# Patient Record
Sex: FEMALE | Race: BLACK OR AFRICAN AMERICAN | NOT HISPANIC OR LATINO | Employment: STUDENT | RURAL
[De-identification: names, ages, dates, MRNs, and addresses within clinical notes are randomized per-mention and may not be internally consistent; named-entity substitution may affect disease eponyms.]

---
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
## 2021-03-16 ENCOUNTER — OFFICE VISIT (OUTPATIENT)
Dept: FAMILY MEDICINE | Facility: CLINIC | Age: 21
End: 2021-03-16
Payer: MEDICAID

## 2021-03-16 VITALS
WEIGHT: 289.38 LBS | RESPIRATION RATE: 16 BRPM | DIASTOLIC BLOOD PRESSURE: 70 MMHG | HEIGHT: 68 IN | OXYGEN SATURATION: 98 % | BODY MASS INDEX: 43.86 KG/M2 | TEMPERATURE: 99 F | SYSTOLIC BLOOD PRESSURE: 122 MMHG | HEART RATE: 84 BPM

## 2021-03-16 DIAGNOSIS — N63.13 BREAST LUMP ON RIGHT SIDE AT 7 O'CLOCK POSITION: Primary | ICD-10-CM

## 2021-03-16 PROCEDURE — 99213 PR OFFICE/OUTPT VISIT, EST, LEVL III, 20-29 MIN: ICD-10-PCS | Mod: ,,, | Performed by: NURSE PRACTITIONER

## 2021-03-16 PROCEDURE — 3008F BODY MASS INDEX DOCD: CPT | Mod: ,,, | Performed by: NURSE PRACTITIONER

## 2021-03-16 PROCEDURE — 3008F PR BODY MASS INDEX (BMI) DOCUMENTED: ICD-10-PCS | Mod: ,,, | Performed by: NURSE PRACTITIONER

## 2021-03-16 PROCEDURE — 99213 OFFICE O/P EST LOW 20 MIN: CPT | Mod: ,,, | Performed by: NURSE PRACTITIONER

## 2021-03-26 ENCOUNTER — HOSPITAL ENCOUNTER (OUTPATIENT)
Dept: RADIOLOGY | Facility: HOSPITAL | Age: 21
Discharge: HOME | End: 2021-03-26
Attending: NURSE PRACTITIONER
Payer: MEDICAID

## 2021-03-26 DIAGNOSIS — N63.13 BREAST LUMP ON RIGHT SIDE AT 7 O'CLOCK POSITION: ICD-10-CM

## 2021-03-26 PROCEDURE — 76641 ULTRASOUND BREAST COMPLETE: CPT | Mod: TC,50

## 2021-03-26 PROCEDURE — 76641 ULTRASOUND BREAST COMPLETE: CPT | Mod: 26,RT,, | Performed by: RADIOLOGY

## 2021-03-26 PROCEDURE — 76641 US BREAST BILATERAL COMPLETE: ICD-10-PCS | Mod: 26,RT,, | Performed by: RADIOLOGY

## 2021-04-08 ENCOUNTER — OFFICE VISIT (OUTPATIENT)
Dept: FAMILY MEDICINE | Facility: CLINIC | Age: 21
End: 2021-04-08
Payer: MEDICAID

## 2021-04-08 VITALS
HEART RATE: 118 BPM | SYSTOLIC BLOOD PRESSURE: 101 MMHG | HEIGHT: 66 IN | OXYGEN SATURATION: 98 % | WEIGHT: 293 LBS | TEMPERATURE: 99 F | RESPIRATION RATE: 17 BRPM | BODY MASS INDEX: 47.09 KG/M2 | DIASTOLIC BLOOD PRESSURE: 71 MMHG

## 2021-04-08 DIAGNOSIS — J02.9 ACUTE PHARYNGITIS, UNSPECIFIED ETIOLOGY: ICD-10-CM

## 2021-04-08 DIAGNOSIS — R11.0 NAUSEA: ICD-10-CM

## 2021-04-08 DIAGNOSIS — Z20.828 CONTACT WITH OR EXPOSURE TO VIRAL DISEASE: Primary | ICD-10-CM

## 2021-04-08 LAB
CTP QC/QA: YES
CTP QC/QA: YES
FLUAV AG NPH QL: NEGATIVE
FLUBV AG NPH QL: NEGATIVE
S PYO RRNA THROAT QL PROBE: NEGATIVE
SARS-COV-2 AG RESP QL IA.RAPID: NEGATIVE

## 2021-04-08 PROCEDURE — 99213 PR OFFICE/OUTPT VISIT, EST, LEVL III, 20-29 MIN: ICD-10-PCS | Mod: ,,, | Performed by: NURSE PRACTITIONER

## 2021-04-08 PROCEDURE — 87428 POCT SARS-COV2 (COVID) WITH FLU ANTIGEN: ICD-10-PCS | Mod: QW,,, | Performed by: NURSE PRACTITIONER

## 2021-04-08 PROCEDURE — 99051 PR MEDICAL SERVICES, EVE/WKEND/HOLIDAY: ICD-10-PCS | Mod: ,,, | Performed by: NURSE PRACTITIONER

## 2021-04-08 PROCEDURE — 87428 SARSCOV & INF VIR A&B AG IA: CPT | Mod: QW,,, | Performed by: NURSE PRACTITIONER

## 2021-04-08 PROCEDURE — 87880 STREP A ASSAY W/OPTIC: CPT | Mod: QW,,, | Performed by: NURSE PRACTITIONER

## 2021-04-08 PROCEDURE — 99051 MED SERV EVE/WKEND/HOLIDAY: CPT | Mod: ,,, | Performed by: NURSE PRACTITIONER

## 2021-04-08 PROCEDURE — 87880 POCT RAPID STREP A: ICD-10-PCS | Mod: QW,,, | Performed by: NURSE PRACTITIONER

## 2021-04-08 PROCEDURE — 99213 OFFICE O/P EST LOW 20 MIN: CPT | Mod: ,,, | Performed by: NURSE PRACTITIONER

## 2021-04-08 RX ORDER — PROMETHAZINE HYDROCHLORIDE 25 MG/1
25 TABLET ORAL EVERY 6 HOURS PRN
Qty: 15 TABLET | Refills: 0 | Status: ON HOLD | OUTPATIENT
Start: 2021-04-08 | End: 2021-05-03

## 2021-04-20 ENCOUNTER — OFFICE VISIT (OUTPATIENT)
Dept: SURGERY | Facility: CLINIC | Age: 21
End: 2021-04-20
Payer: MEDICAID

## 2021-04-20 VITALS — WEIGHT: 282 LBS | HEART RATE: 83 BPM | BODY MASS INDEX: 46.98 KG/M2 | HEIGHT: 65 IN | OXYGEN SATURATION: 99 %

## 2021-04-20 DIAGNOSIS — N63.0 BREAST MASS: Primary | ICD-10-CM

## 2021-04-20 DIAGNOSIS — Z20.822 ENCOUNTER FOR LABORATORY TESTING FOR COVID-19 VIRUS: ICD-10-CM

## 2021-04-20 PROCEDURE — 99213 PR OFFICE/OUTPT VISIT, EST, LEVL III, 20-29 MIN: ICD-10-PCS | Mod: S$PBB,,, | Performed by: SURGERY

## 2021-04-20 PROCEDURE — 99214 OFFICE O/P EST MOD 30 MIN: CPT | Mod: PBBFAC | Performed by: SURGERY

## 2021-04-20 PROCEDURE — 99999 PR PBB SHADOW E&M-EST. PATIENT-LVL IV: CPT | Mod: PBBFAC,,, | Performed by: SURGERY

## 2021-04-20 PROCEDURE — 99999 PR PBB SHADOW E&M-EST. PATIENT-LVL IV: ICD-10-PCS | Mod: PBBFAC,,, | Performed by: SURGERY

## 2021-04-20 PROCEDURE — 99213 OFFICE O/P EST LOW 20 MIN: CPT | Mod: S$PBB,,, | Performed by: SURGERY

## 2021-04-20 RX ORDER — SODIUM CHLORIDE 9 MG/ML
INJECTION, SOLUTION INTRAVENOUS CONTINUOUS
Status: CANCELLED | OUTPATIENT
Start: 2021-04-20

## 2021-04-30 DIAGNOSIS — Z01.812 ENCOUNTER FOR PREPROCEDURAL LABORATORY EXAMINATION: Primary | ICD-10-CM

## 2021-05-03 ENCOUNTER — ANESTHESIA (OUTPATIENT)
Dept: SURGERY | Facility: HOSPITAL | Age: 21
End: 2021-05-03
Payer: MEDICAID

## 2021-05-03 ENCOUNTER — HOSPITAL ENCOUNTER (OUTPATIENT)
Facility: HOSPITAL | Age: 21
Discharge: HOME | End: 2021-05-03
Attending: SURGERY | Admitting: SURGERY
Payer: MEDICAID

## 2021-05-03 ENCOUNTER — ANESTHESIA EVENT (OUTPATIENT)
Dept: SURGERY | Facility: HOSPITAL | Age: 21
End: 2021-05-03
Payer: MEDICAID

## 2021-05-03 VITALS
HEIGHT: 66 IN | HEART RATE: 88 BPM | RESPIRATION RATE: 16 BRPM | OXYGEN SATURATION: 96 % | DIASTOLIC BLOOD PRESSURE: 60 MMHG | SYSTOLIC BLOOD PRESSURE: 138 MMHG | BODY MASS INDEX: 46.77 KG/M2 | TEMPERATURE: 98 F | WEIGHT: 291 LBS

## 2021-05-03 DIAGNOSIS — N63.0 BREAST MASS: ICD-10-CM

## 2021-05-03 LAB — HCG UR QL IA.RAPID: NEGATIVE

## 2021-05-03 PROCEDURE — 00400 ANES INTEGUMENTARY SYS NOS: CPT | Performed by: SURGERY

## 2021-05-03 PROCEDURE — 81025 URINE PREGNANCY TEST: CPT | Performed by: ANESTHESIOLOGY

## 2021-05-03 PROCEDURE — D9220A PRA ANESTHESIA: Mod: ANES,,, | Performed by: ANESTHESIOLOGY

## 2021-05-03 PROCEDURE — 36000707: Performed by: SURGERY

## 2021-05-03 PROCEDURE — 71000033 HC RECOVERY, INTIAL HOUR: Performed by: SURGERY

## 2021-05-03 PROCEDURE — 27201423 OPTIME MED/SURG SUP & DEVICES STERILE SUPPLY: Performed by: SURGERY

## 2021-05-03 PROCEDURE — 25000003 PHARM REV CODE 250: Performed by: SURGERY

## 2021-05-03 PROCEDURE — 88305 TISSUE EXAM BY PATHOLOGIST: CPT | Mod: SUR | Performed by: SURGERY

## 2021-05-03 PROCEDURE — 36000706: Performed by: SURGERY

## 2021-05-03 PROCEDURE — 88305 TISSUE EXAM BY PATHOLOGIST: CPT | Mod: 26,,, | Performed by: PATHOLOGY

## 2021-05-03 PROCEDURE — D9220A PRA ANESTHESIA: Mod: CRNA,,, | Performed by: NURSE ANESTHETIST, CERTIFIED REGISTERED

## 2021-05-03 PROCEDURE — 37000009 HC ANESTHESIA EA ADD 15 MINS: Performed by: SURGERY

## 2021-05-03 PROCEDURE — 19301 PR MASTECTOMY, PARTIAL: ICD-10-PCS | Mod: RT,,, | Performed by: SURGERY

## 2021-05-03 PROCEDURE — 37000008 HC ANESTHESIA 1ST 15 MINUTES: Performed by: SURGERY

## 2021-05-03 PROCEDURE — 88305 SURGICAL PATHOLOGY: ICD-10-PCS | Mod: 26,,, | Performed by: PATHOLOGY

## 2021-05-03 PROCEDURE — 63600175 PHARM REV CODE 636 W HCPCS: Performed by: ANESTHESIOLOGY

## 2021-05-03 PROCEDURE — 25000003 PHARM REV CODE 250

## 2021-05-03 PROCEDURE — 19301 PARTIAL MASTECTOMY: CPT | Mod: RT,,, | Performed by: SURGERY

## 2021-05-03 PROCEDURE — 71000015 HC POSTOP RECOV 1ST HR: Performed by: SURGERY

## 2021-05-03 PROCEDURE — 63600175 PHARM REV CODE 636 W HCPCS: Performed by: NURSE ANESTHETIST, CERTIFIED REGISTERED

## 2021-05-03 PROCEDURE — D9220A PRA ANESTHESIA: ICD-10-PCS | Mod: CRNA,,, | Performed by: NURSE ANESTHETIST, CERTIFIED REGISTERED

## 2021-05-03 PROCEDURE — D9220A PRA ANESTHESIA: ICD-10-PCS | Mod: ANES,,, | Performed by: ANESTHESIOLOGY

## 2021-05-03 RX ORDER — SODIUM CHLORIDE 9 MG/ML
INJECTION, SOLUTION INTRAVENOUS CONTINUOUS
Status: DISCONTINUED | OUTPATIENT
Start: 2021-05-03 | End: 2021-05-03 | Stop reason: HOSPADM

## 2021-05-03 RX ORDER — LIDOCAINE HYDROCHLORIDE 10 MG/ML
1 INJECTION, SOLUTION EPIDURAL; INFILTRATION; INTRACAUDAL; PERINEURAL ONCE
Status: DISCONTINUED | OUTPATIENT
Start: 2021-05-03 | End: 2021-05-03 | Stop reason: HOSPADM

## 2021-05-03 RX ORDER — ONDANSETRON 2 MG/ML
INJECTION INTRAMUSCULAR; INTRAVENOUS
Status: DISCONTINUED | OUTPATIENT
Start: 2021-05-03 | End: 2021-05-03

## 2021-05-03 RX ORDER — CEFAZOLIN SODIUM 2 G/50ML
2 SOLUTION INTRAVENOUS
Status: DISCONTINUED | OUTPATIENT
Start: 2021-05-03 | End: 2021-05-03 | Stop reason: HOSPADM

## 2021-05-03 RX ORDER — DIPHENHYDRAMINE HYDROCHLORIDE 50 MG/ML
25 INJECTION INTRAMUSCULAR; INTRAVENOUS EVERY 6 HOURS PRN
Status: DISCONTINUED | OUTPATIENT
Start: 2021-05-03 | End: 2021-05-03 | Stop reason: HOSPADM

## 2021-05-03 RX ORDER — DEXAMETHASONE SODIUM PHOSPHATE 4 MG/ML
INJECTION, SOLUTION INTRA-ARTICULAR; INTRALESIONAL; INTRAMUSCULAR; INTRAVENOUS; SOFT TISSUE
Status: DISCONTINUED | OUTPATIENT
Start: 2021-05-03 | End: 2021-05-03

## 2021-05-03 RX ORDER — HYDROCODONE BITARTRATE AND ACETAMINOPHEN 5; 325 MG/1; MG/1
1 TABLET ORAL EVERY 4 HOURS PRN
Status: DISCONTINUED | OUTPATIENT
Start: 2021-05-03 | End: 2021-05-03 | Stop reason: HOSPADM

## 2021-05-03 RX ORDER — OXYCODONE AND ACETAMINOPHEN 5; 325 MG/1; MG/1
1 TABLET ORAL EVERY 4 HOURS PRN
Qty: 10 TABLET | Refills: 0 | Status: SHIPPED | OUTPATIENT
Start: 2021-05-03 | End: 2021-11-28

## 2021-05-03 RX ORDER — MORPHINE SULFATE 10 MG/ML
4 INJECTION INTRAMUSCULAR; INTRAVENOUS; SUBCUTANEOUS EVERY 5 MIN PRN
Status: DISCONTINUED | OUTPATIENT
Start: 2021-05-03 | End: 2021-05-03 | Stop reason: HOSPADM

## 2021-05-03 RX ORDER — ONDANSETRON 2 MG/ML
4 INJECTION INTRAMUSCULAR; INTRAVENOUS DAILY PRN
Status: DISCONTINUED | OUTPATIENT
Start: 2021-05-03 | End: 2021-05-03 | Stop reason: HOSPADM

## 2021-05-03 RX ORDER — MEPERIDINE HYDROCHLORIDE 25 MG/ML
25 INJECTION INTRAMUSCULAR; INTRAVENOUS; SUBCUTANEOUS EVERY 10 MIN PRN
Status: DISCONTINUED | OUTPATIENT
Start: 2021-05-03 | End: 2021-05-03 | Stop reason: HOSPADM

## 2021-05-03 RX ORDER — BUPIVACAINE HYDROCHLORIDE 2.5 MG/ML
INJECTION, SOLUTION EPIDURAL; INFILTRATION; INTRACAUDAL
Status: DISCONTINUED | OUTPATIENT
Start: 2021-05-03 | End: 2021-05-03 | Stop reason: HOSPADM

## 2021-05-03 RX ORDER — FENTANYL CITRATE 50 UG/ML
INJECTION, SOLUTION INTRAMUSCULAR; INTRAVENOUS
Status: DISCONTINUED | OUTPATIENT
Start: 2021-05-03 | End: 2021-05-03

## 2021-05-03 RX ORDER — PROPOFOL 10 MG/ML
INJECTION, EMULSION INTRAVENOUS
Status: DISCONTINUED | OUTPATIENT
Start: 2021-05-03 | End: 2021-05-03

## 2021-05-03 RX ORDER — SODIUM CHLORIDE, SODIUM LACTATE, POTASSIUM CHLORIDE, CALCIUM CHLORIDE 600; 310; 30; 20 MG/100ML; MG/100ML; MG/100ML; MG/100ML
INJECTION, SOLUTION INTRAVENOUS CONTINUOUS
Status: DISCONTINUED | OUTPATIENT
Start: 2021-05-03 | End: 2021-05-03 | Stop reason: HOSPADM

## 2021-05-03 RX ORDER — HYDROMORPHONE HYDROCHLORIDE 2 MG/ML
0.5 INJECTION, SOLUTION INTRAMUSCULAR; INTRAVENOUS; SUBCUTANEOUS EVERY 5 MIN PRN
Status: DISCONTINUED | OUTPATIENT
Start: 2021-05-03 | End: 2021-05-03 | Stop reason: HOSPADM

## 2021-05-03 RX ORDER — PHENYLEPHRINE HYDROCHLORIDE 10 MG/ML
INJECTION INTRAVENOUS
Status: DISCONTINUED | OUTPATIENT
Start: 2021-05-03 | End: 2021-05-03

## 2021-05-03 RX ORDER — SODIUM CHLORIDE, SODIUM LACTATE, POTASSIUM CHLORIDE, CALCIUM CHLORIDE 600; 310; 30; 20 MG/100ML; MG/100ML; MG/100ML; MG/100ML
INJECTION, SOLUTION INTRAVENOUS CONTINUOUS PRN
Status: DISCONTINUED | OUTPATIENT
Start: 2021-05-03 | End: 2021-05-03

## 2021-05-03 RX ORDER — MIDAZOLAM HYDROCHLORIDE 1 MG/ML
INJECTION INTRAMUSCULAR; INTRAVENOUS
Status: DISCONTINUED | OUTPATIENT
Start: 2021-05-03 | End: 2021-05-03

## 2021-05-03 RX ORDER — LIDOCAINE HCL/PF 100 MG/5ML
SYRINGE (ML) INTRAVENOUS
Status: DISCONTINUED | OUTPATIENT
Start: 2021-05-03 | End: 2021-05-03

## 2021-05-03 RX ADMIN — PHENYLEPHRINE HYDROCHLORIDE 100 MCG: 10 INJECTION INTRAVENOUS at 11:05

## 2021-05-03 RX ADMIN — SODIUM CHLORIDE, POTASSIUM CHLORIDE, SODIUM LACTATE AND CALCIUM CHLORIDE: 600; 310; 30; 20 INJECTION, SOLUTION INTRAVENOUS at 09:05

## 2021-05-03 RX ADMIN — FENTANYL CITRATE 100 MCG: 50 INJECTION INTRAMUSCULAR; INTRAVENOUS at 11:05

## 2021-05-03 RX ADMIN — DEXAMETHASONE SODIUM PHOSPHATE 8 MG: 4 INJECTION, SOLUTION INTRA-ARTICULAR; INTRALESIONAL; INTRAMUSCULAR; INTRAVENOUS; SOFT TISSUE at 11:05

## 2021-05-03 RX ADMIN — MIDAZOLAM HYDROCHLORIDE 2 MG: 1 INJECTION, SOLUTION INTRAMUSCULAR; INTRAVENOUS at 11:05

## 2021-05-03 RX ADMIN — PROPOFOL 200 MG: 10 INJECTION, EMULSION INTRAVENOUS at 11:05

## 2021-05-03 RX ADMIN — ONDANSETRON 8 MG: 2 INJECTION INTRAMUSCULAR; INTRAVENOUS at 11:05

## 2021-05-03 RX ADMIN — Medication 100 MG: at 11:05

## 2021-05-03 RX ADMIN — SODIUM CHLORIDE, POTASSIUM CHLORIDE, SODIUM LACTATE AND CALCIUM CHLORIDE: 600; 310; 30; 20 INJECTION, SOLUTION INTRAVENOUS at 11:05

## 2021-05-04 LAB
ESTROGEN SERPL-MCNC: NORMAL PG/ML
LAB AP GROSS DESCRIPTION: NORMAL
LAB AP LABORATORY NOTES: NORMAL
T3RU NFR SERPL: NORMAL %

## 2021-07-27 ENCOUNTER — HOSPITAL ENCOUNTER (EMERGENCY)
Facility: HOSPITAL | Age: 21
Discharge: HOME | End: 2021-07-27
Payer: MEDICAID

## 2021-07-27 VITALS
TEMPERATURE: 99 F | WEIGHT: 287 LBS | HEIGHT: 66 IN | BODY MASS INDEX: 46.12 KG/M2 | RESPIRATION RATE: 18 BRPM | HEART RATE: 85 BPM | OXYGEN SATURATION: 99 % | DIASTOLIC BLOOD PRESSURE: 98 MMHG | SYSTOLIC BLOOD PRESSURE: 128 MMHG

## 2021-07-27 DIAGNOSIS — Z91.89 AT INCREASED RISK OF EXPOSURE TO COVID-19 VIRUS: ICD-10-CM

## 2021-07-27 DIAGNOSIS — J31.0 RHINITIS, UNSPECIFIED TYPE: Primary | ICD-10-CM

## 2021-07-27 PROCEDURE — 99282 EMERGENCY DEPT VISIT SF MDM: CPT | Mod: ,,, | Performed by: NURSE PRACTITIONER

## 2021-07-27 PROCEDURE — 99281 EMR DPT VST MAYX REQ PHY/QHP: CPT

## 2021-07-27 PROCEDURE — 99282 PR EMERGENCY DEPT VISIT,LEVEL II: ICD-10-PCS | Mod: ,,, | Performed by: NURSE PRACTITIONER

## 2021-07-28 ENCOUNTER — OFFICE VISIT (OUTPATIENT)
Dept: FAMILY MEDICINE | Facility: CLINIC | Age: 21
End: 2021-07-28
Payer: MEDICAID

## 2021-07-28 VITALS
WEIGHT: 270 LBS | RESPIRATION RATE: 18 BRPM | BODY MASS INDEX: 43.39 KG/M2 | HEART RATE: 102 BPM | TEMPERATURE: 99 F | OXYGEN SATURATION: 98 % | SYSTOLIC BLOOD PRESSURE: 114 MMHG | HEIGHT: 66 IN | DIASTOLIC BLOOD PRESSURE: 96 MMHG

## 2021-07-28 DIAGNOSIS — R50.9 FEVER, UNSPECIFIED FEVER CAUSE: ICD-10-CM

## 2021-07-28 DIAGNOSIS — R68.83 CHILLS: ICD-10-CM

## 2021-07-28 DIAGNOSIS — J06.9 ACUTE UPPER RESPIRATORY INFECTION: Primary | ICD-10-CM

## 2021-07-28 PROCEDURE — 99212 OFFICE O/P EST SF 10 MIN: CPT | Mod: ,,, | Performed by: FAMILY MEDICINE

## 2021-07-28 PROCEDURE — 99212 PR OFFICE/OUTPT VISIT, EST, LEVL II, 10-19 MIN: ICD-10-PCS | Mod: ,,, | Performed by: FAMILY MEDICINE

## 2021-07-28 PROCEDURE — 87880 STREP A ASSAY W/OPTIC: CPT | Mod: RHCUB | Performed by: FAMILY MEDICINE

## 2021-07-28 PROCEDURE — 87428 SARSCOV & INF VIR A&B AG IA: CPT | Mod: RHCUB | Performed by: FAMILY MEDICINE

## 2021-07-28 RX ORDER — AMOXICILLIN 500 MG/1
500 TABLET, FILM COATED ORAL EVERY 12 HOURS
Qty: 20 TABLET | Refills: 0 | Status: SHIPPED | OUTPATIENT
Start: 2021-07-28 | End: 2021-08-07

## 2021-07-28 RX ORDER — CHLORPHENIRAMINE MALEATE, DEXTROMETHORPHAN HYDROBROMIDE, AND PHENYLEPHRINE HYDROCHLORIDE 4; 10; 10 MG/1; MG/1; MG/1
1 TABLET, COATED ORAL EVERY 6 HOURS PRN
Qty: 30 TABLET | Refills: 0 | Status: SHIPPED | OUTPATIENT
Start: 2021-07-28 | End: 2022-12-01

## 2021-07-29 ENCOUNTER — OFFICE VISIT (OUTPATIENT)
Dept: OBSTETRICS AND GYNECOLOGY | Facility: CLINIC | Age: 21
End: 2021-07-29
Payer: MEDICAID

## 2021-07-29 ENCOUNTER — TELEPHONE (OUTPATIENT)
Dept: EMERGENCY MEDICINE | Facility: HOSPITAL | Age: 21
End: 2021-07-29

## 2021-07-29 VITALS
HEART RATE: 74 BPM | RESPIRATION RATE: 18 BRPM | DIASTOLIC BLOOD PRESSURE: 72 MMHG | HEIGHT: 66 IN | WEIGHT: 293 LBS | OXYGEN SATURATION: 98 % | SYSTOLIC BLOOD PRESSURE: 122 MMHG | BODY MASS INDEX: 47.09 KG/M2

## 2021-07-29 DIAGNOSIS — Z30.019 ENCOUNTER FOR INITIAL PRESCRIPTION OF CONTRACEPTIVES, UNSPECIFIED CONTRACEPTIVE: Primary | ICD-10-CM

## 2021-07-29 LAB
B-HCG UR QL: NEGATIVE
CTP QC/QA: YES

## 2021-07-29 PROCEDURE — 99214 PR OFFICE/OUTPT VISIT, EST, LEVL IV, 30-39 MIN: ICD-10-PCS | Mod: ,,, | Performed by: OBSTETRICS & GYNECOLOGY

## 2021-07-29 PROCEDURE — 81025 POCT URINE PREGNANCY: ICD-10-PCS | Mod: QW,,, | Performed by: OBSTETRICS & GYNECOLOGY

## 2021-07-29 PROCEDURE — 99214 OFFICE O/P EST MOD 30 MIN: CPT | Mod: ,,, | Performed by: OBSTETRICS & GYNECOLOGY

## 2021-07-29 PROCEDURE — 81025 URINE PREGNANCY TEST: CPT | Mod: QW,,, | Performed by: OBSTETRICS & GYNECOLOGY

## 2021-08-15 ENCOUNTER — TELEPHONE (OUTPATIENT)
Dept: EMERGENCY MEDICINE | Facility: HOSPITAL | Age: 21
End: 2021-08-15

## 2021-08-16 ENCOUNTER — PROCEDURE VISIT (OUTPATIENT)
Dept: OBSTETRICS AND GYNECOLOGY | Facility: CLINIC | Age: 21
End: 2021-08-16
Payer: MEDICAID

## 2021-08-16 VITALS
HEIGHT: 66 IN | BODY MASS INDEX: 44.52 KG/M2 | TEMPERATURE: 97 F | OXYGEN SATURATION: 99 % | RESPIRATION RATE: 18 BRPM | WEIGHT: 277 LBS | DIASTOLIC BLOOD PRESSURE: 81 MMHG | HEART RATE: 75 BPM | SYSTOLIC BLOOD PRESSURE: 126 MMHG

## 2021-08-16 DIAGNOSIS — Z30.019 ENCOUNTER FOR INITIAL PRESCRIPTION OF CONTRACEPTIVES, UNSPECIFIED CONTRACEPTIVE: Primary | ICD-10-CM

## 2021-08-16 LAB
B-HCG UR QL: NEGATIVE
CTP QC/QA: YES

## 2021-08-16 PROCEDURE — 99214 PR OFFICE/OUTPT VISIT, EST, LEVL IV, 30-39 MIN: ICD-10-PCS | Mod: ,,, | Performed by: OBSTETRICS & GYNECOLOGY

## 2021-08-16 PROCEDURE — 81025 POCT URINE PREGNANCY: ICD-10-PCS | Mod: QW,,, | Performed by: OBSTETRICS & GYNECOLOGY

## 2021-08-16 PROCEDURE — 81025 URINE PREGNANCY TEST: CPT | Mod: QW,,, | Performed by: OBSTETRICS & GYNECOLOGY

## 2021-08-16 PROCEDURE — 99214 OFFICE O/P EST MOD 30 MIN: CPT | Mod: ,,, | Performed by: OBSTETRICS & GYNECOLOGY

## 2021-08-16 RX ORDER — NORETHINDRONE ACETATE AND ETHINYL ESTRADIOL .02; 1 MG/1; MG/1
1 TABLET ORAL DAILY
Qty: 30 TABLET | Refills: 11 | Status: SHIPPED | OUTPATIENT
Start: 2021-08-16 | End: 2022-12-01

## 2021-10-01 ENCOUNTER — OFFICE VISIT (OUTPATIENT)
Dept: FAMILY MEDICINE | Facility: CLINIC | Age: 21
End: 2021-10-01
Payer: MEDICAID

## 2021-10-01 VITALS — OXYGEN SATURATION: 100 % | RESPIRATION RATE: 20 BRPM | TEMPERATURE: 98 F | HEART RATE: 97 BPM

## 2021-10-01 DIAGNOSIS — J02.9 ACUTE PHARYNGITIS, UNSPECIFIED ETIOLOGY: ICD-10-CM

## 2021-10-01 DIAGNOSIS — J01.00 ACUTE NON-RECURRENT MAXILLARY SINUSITIS: ICD-10-CM

## 2021-10-01 DIAGNOSIS — J02.9 SORE THROAT: Primary | ICD-10-CM

## 2021-10-01 DIAGNOSIS — Z20.822 EXPOSURE TO COVID-19 VIRUS: ICD-10-CM

## 2021-10-01 PROCEDURE — 87880 STREP A ASSAY W/OPTIC: CPT | Mod: RHCUB | Performed by: NURSE PRACTITIONER

## 2021-10-01 PROCEDURE — 99213 PR OFFICE/OUTPT VISIT, EST, LEVL III, 20-29 MIN: ICD-10-PCS | Mod: ,,, | Performed by: NURSE PRACTITIONER

## 2021-10-01 PROCEDURE — 99213 OFFICE O/P EST LOW 20 MIN: CPT | Mod: ,,, | Performed by: NURSE PRACTITIONER

## 2021-10-01 PROCEDURE — 87428 SARSCOV & INF VIR A&B AG IA: CPT | Mod: RHCUB | Performed by: NURSE PRACTITIONER

## 2021-10-01 RX ORDER — AMOXICILLIN 875 MG/1
875 TABLET, FILM COATED ORAL EVERY 12 HOURS
Qty: 20 TABLET | Refills: 0 | Status: SHIPPED | OUTPATIENT
Start: 2021-10-01 | End: 2021-10-11

## 2021-10-01 RX ORDER — METHYLPREDNISOLONE 4 MG/1
TABLET ORAL
Qty: 1 PACKAGE | Refills: 0 | Status: SHIPPED | OUTPATIENT
Start: 2021-10-01 | End: 2022-03-23

## 2021-10-18 ENCOUNTER — OFFICE VISIT (OUTPATIENT)
Dept: FAMILY MEDICINE | Facility: CLINIC | Age: 21
End: 2021-10-18
Payer: MEDICAID

## 2021-10-18 VITALS
WEIGHT: 293 LBS | DIASTOLIC BLOOD PRESSURE: 88 MMHG | BODY MASS INDEX: 48 KG/M2 | SYSTOLIC BLOOD PRESSURE: 163 MMHG | TEMPERATURE: 98 F | HEART RATE: 99 BPM | OXYGEN SATURATION: 100 %

## 2021-10-18 DIAGNOSIS — B96.89 BV (BACTERIAL VAGINOSIS): Primary | ICD-10-CM

## 2021-10-18 DIAGNOSIS — Z20.2 POSSIBLE EXPOSURE TO STD: ICD-10-CM

## 2021-10-18 DIAGNOSIS — N76.0 BV (BACTERIAL VAGINOSIS): Primary | ICD-10-CM

## 2021-10-18 LAB
CANDIDA SPECIES: NEGATIVE
GARDNERELLA: POSITIVE
HIV 1+O+2 AB SERPL QL: NORMAL
SYPHILIS AB INTERPRETATION: NORMAL
TRICHOMONAS: NEGATIVE

## 2021-10-18 PROCEDURE — 86780 TREPONEMA PALLIDUM (SYPHILIS) ANTIBODY: ICD-10-PCS | Mod: ,,, | Performed by: CLINICAL MEDICAL LABORATORY

## 2021-10-18 PROCEDURE — 87591 N.GONORRHOEAE DNA AMP PROB: CPT | Mod: ,,, | Performed by: CLINICAL MEDICAL LABORATORY

## 2021-10-18 PROCEDURE — 87491 CHLAMYDIA/GONORRHOEAE(GC), PCR: ICD-10-PCS | Mod: ,,, | Performed by: CLINICAL MEDICAL LABORATORY

## 2021-10-18 PROCEDURE — 87591 CHLAMYDIA/GONORRHOEAE(GC), PCR: ICD-10-PCS | Mod: ,,, | Performed by: CLINICAL MEDICAL LABORATORY

## 2021-10-18 PROCEDURE — 87660 TRICHOMONAS VAGIN DIR PROBE: CPT | Mod: ,,, | Performed by: CLINICAL MEDICAL LABORATORY

## 2021-10-18 PROCEDURE — 87480 BACTERIAL VAGINOSIS: ICD-10-PCS | Mod: ,,, | Performed by: CLINICAL MEDICAL LABORATORY

## 2021-10-18 PROCEDURE — 87480 CANDIDA DNA DIR PROBE: CPT | Mod: ,,, | Performed by: CLINICAL MEDICAL LABORATORY

## 2021-10-18 PROCEDURE — 87389 HIV 1 / 2 ANTIBODY: ICD-10-PCS | Mod: ,,, | Performed by: CLINICAL MEDICAL LABORATORY

## 2021-10-18 PROCEDURE — 87660 BACTERIAL VAGINOSIS: ICD-10-PCS | Mod: ,,, | Performed by: CLINICAL MEDICAL LABORATORY

## 2021-10-18 PROCEDURE — 87491 CHLMYD TRACH DNA AMP PROBE: CPT | Mod: ,,, | Performed by: CLINICAL MEDICAL LABORATORY

## 2021-10-18 PROCEDURE — 86780 TREPONEMA PALLIDUM: CPT | Mod: ,,, | Performed by: CLINICAL MEDICAL LABORATORY

## 2021-10-18 PROCEDURE — 87510 GARDNER VAG DNA DIR PROBE: CPT | Mod: ,,, | Performed by: CLINICAL MEDICAL LABORATORY

## 2021-10-18 PROCEDURE — 87510 BACTERIAL VAGINOSIS: ICD-10-PCS | Mod: ,,, | Performed by: CLINICAL MEDICAL LABORATORY

## 2021-10-18 PROCEDURE — 87389 HIV-1 AG W/HIV-1&-2 AB AG IA: CPT | Mod: ,,, | Performed by: CLINICAL MEDICAL LABORATORY

## 2021-10-18 PROCEDURE — 99213 OFFICE O/P EST LOW 20 MIN: CPT | Mod: ,,, | Performed by: NURSE PRACTITIONER

## 2021-10-18 PROCEDURE — 99213 PR OFFICE/OUTPT VISIT, EST, LEVL III, 20-29 MIN: ICD-10-PCS | Mod: ,,, | Performed by: NURSE PRACTITIONER

## 2021-10-19 ENCOUNTER — TELEPHONE (OUTPATIENT)
Dept: FAMILY MEDICINE | Facility: CLINIC | Age: 21
End: 2021-10-19

## 2021-10-19 ENCOUNTER — CLINICAL SUPPORT (OUTPATIENT)
Dept: FAMILY MEDICINE | Facility: CLINIC | Age: 21
End: 2021-10-19
Payer: MEDICAID

## 2021-10-19 LAB
CHLAMYDIA BY PCR: NEGATIVE
N. GONORRHOEAE (GC) BY PCR: NEGATIVE

## 2021-10-19 RX ORDER — METRONIDAZOLE 500 MG/1
500 TABLET ORAL EVERY 12 HOURS
Qty: 14 TABLET | Refills: 0 | Status: SHIPPED | OUTPATIENT
Start: 2021-10-19 | End: 2021-11-28 | Stop reason: ALTCHOICE

## 2021-10-23 ENCOUNTER — HOSPITAL ENCOUNTER (EMERGENCY)
Facility: HOSPITAL | Age: 21
Discharge: HOME | End: 2021-10-23
Payer: MEDICAID

## 2021-10-23 VITALS
SYSTOLIC BLOOD PRESSURE: 134 MMHG | TEMPERATURE: 99 F | OXYGEN SATURATION: 97 % | RESPIRATION RATE: 18 BRPM | HEART RATE: 95 BPM | HEIGHT: 66 IN | BODY MASS INDEX: 47.09 KG/M2 | WEIGHT: 293 LBS | DIASTOLIC BLOOD PRESSURE: 82 MMHG

## 2021-10-23 DIAGNOSIS — J03.90 TONSILLITIS: Primary | ICD-10-CM

## 2021-10-23 PROCEDURE — 63600175 PHARM REV CODE 636 W HCPCS: Performed by: PHYSICIAN ASSISTANT

## 2021-10-23 PROCEDURE — 96372 THER/PROPH/DIAG INJ SC/IM: CPT

## 2021-10-23 PROCEDURE — 99284 EMERGENCY DEPT VISIT MOD MDM: CPT

## 2021-10-23 PROCEDURE — 99283 EMERGENCY DEPT VISIT LOW MDM: CPT | Mod: ,,, | Performed by: PHYSICIAN ASSISTANT

## 2021-10-23 PROCEDURE — 99283 PR EMERGENCY DEPT VISIT,LEVEL III: ICD-10-PCS | Mod: ,,, | Performed by: PHYSICIAN ASSISTANT

## 2021-10-23 PROCEDURE — 25000003 PHARM REV CODE 250: Performed by: PHYSICIAN ASSISTANT

## 2021-10-23 RX ORDER — CLINDAMYCIN PHOSPHATE 150 MG/ML
600 INJECTION, SOLUTION INTRAVENOUS
Status: COMPLETED | OUTPATIENT
Start: 2021-10-23 | End: 2021-10-23

## 2021-10-23 RX ORDER — KETOROLAC TROMETHAMINE 30 MG/ML
30 INJECTION, SOLUTION INTRAMUSCULAR; INTRAVENOUS
Status: COMPLETED | OUTPATIENT
Start: 2021-10-23 | End: 2021-10-23

## 2021-10-23 RX ORDER — AMOXICILLIN AND CLAVULANATE POTASSIUM 875; 125 MG/1; MG/1
1 TABLET, FILM COATED ORAL 2 TIMES DAILY
Qty: 14 TABLET | Refills: 0 | Status: SHIPPED | OUTPATIENT
Start: 2021-10-23 | End: 2022-12-01

## 2021-10-23 RX ORDER — FLUCONAZOLE 150 MG/1
150 TABLET ORAL DAILY
Qty: 1 TABLET | Refills: 0 | Status: SHIPPED | OUTPATIENT
Start: 2021-10-23 | End: 2021-10-24

## 2021-10-23 RX ADMIN — KETOROLAC TROMETHAMINE 30 MG: 30 INJECTION, SOLUTION INTRAMUSCULAR at 09:10

## 2021-10-23 RX ADMIN — CLINDAMYCIN PHOSPHATE 600 MG: 150 INJECTION, SOLUTION INTRAVENOUS at 09:10

## 2021-10-24 ENCOUNTER — TELEPHONE (OUTPATIENT)
Dept: EMERGENCY MEDICINE | Facility: HOSPITAL | Age: 21
End: 2021-10-24
Payer: MEDICAID

## 2021-10-25 ENCOUNTER — OFFICE VISIT (OUTPATIENT)
Dept: FAMILY MEDICINE | Facility: CLINIC | Age: 21
End: 2021-10-25
Payer: MEDICAID

## 2021-10-25 VITALS
BODY MASS INDEX: 41.95 KG/M2 | DIASTOLIC BLOOD PRESSURE: 77 MMHG | TEMPERATURE: 98 F | OXYGEN SATURATION: 99 % | SYSTOLIC BLOOD PRESSURE: 146 MMHG | HEIGHT: 70 IN | HEART RATE: 99 BPM | WEIGHT: 293 LBS | RESPIRATION RATE: 18 BRPM

## 2021-10-25 DIAGNOSIS — J03.01 ACUTE RECURRENT STREPTOCOCCAL TONSILLITIS: Primary | ICD-10-CM

## 2021-10-25 PROCEDURE — 99212 OFFICE O/P EST SF 10 MIN: CPT | Mod: ,,, | Performed by: NURSE PRACTITIONER

## 2021-10-25 PROCEDURE — 99212 PR OFFICE/OUTPT VISIT, EST, LEVL II, 10-19 MIN: ICD-10-PCS | Mod: ,,, | Performed by: NURSE PRACTITIONER

## 2021-11-03 ENCOUNTER — OFFICE VISIT (OUTPATIENT)
Dept: OTOLARYNGOLOGY | Facility: CLINIC | Age: 21
End: 2021-11-03
Payer: MEDICAID

## 2021-11-03 VITALS — HEIGHT: 70 IN | WEIGHT: 293 LBS | BODY MASS INDEX: 41.95 KG/M2

## 2021-11-03 DIAGNOSIS — J03.01 ACUTE RECURRENT STREPTOCOCCAL TONSILLITIS: Primary | ICD-10-CM

## 2021-11-03 DIAGNOSIS — J35.01 CHRONIC TONSILLITIS: ICD-10-CM

## 2021-11-03 PROCEDURE — 99214 OFFICE O/P EST MOD 30 MIN: CPT | Mod: PBBFAC | Performed by: OTOLARYNGOLOGY

## 2021-11-03 PROCEDURE — 99204 PR OFFICE/OUTPT VISIT, NEW, LEVL IV, 45-59 MIN: ICD-10-PCS | Mod: S$PBB,,, | Performed by: OTOLARYNGOLOGY

## 2021-11-03 PROCEDURE — 99204 OFFICE O/P NEW MOD 45 MIN: CPT | Mod: S$PBB,,, | Performed by: OTOLARYNGOLOGY

## 2021-11-15 ENCOUNTER — OFFICE VISIT (OUTPATIENT)
Dept: OBSTETRICS AND GYNECOLOGY | Facility: CLINIC | Age: 21
End: 2021-11-15
Payer: MEDICAID

## 2021-11-15 VITALS
SYSTOLIC BLOOD PRESSURE: 133 MMHG | HEART RATE: 88 BPM | DIASTOLIC BLOOD PRESSURE: 81 MMHG | WEIGHT: 293 LBS | RESPIRATION RATE: 18 BRPM | BODY MASS INDEX: 47.09 KG/M2 | TEMPERATURE: 98 F | HEIGHT: 66 IN

## 2021-11-15 DIAGNOSIS — Z30.41 ENCOUNTER FOR SURVEILLANCE OF CONTRACEPTIVE PILLS: Primary | ICD-10-CM

## 2021-11-15 PROCEDURE — 99213 PR OFFICE/OUTPT VISIT, EST, LEVL III, 20-29 MIN: ICD-10-PCS | Mod: ,,, | Performed by: OBSTETRICS & GYNECOLOGY

## 2021-11-15 PROCEDURE — 99213 OFFICE O/P EST LOW 20 MIN: CPT | Mod: ,,, | Performed by: OBSTETRICS & GYNECOLOGY

## 2021-11-19 ENCOUNTER — ANESTHESIA (OUTPATIENT)
Dept: SURGERY | Facility: HOSPITAL | Age: 21
End: 2021-11-19
Payer: MEDICAID

## 2021-11-19 ENCOUNTER — ANESTHESIA EVENT (OUTPATIENT)
Dept: SURGERY | Facility: HOSPITAL | Age: 21
End: 2021-11-19
Payer: MEDICAID

## 2021-11-19 ENCOUNTER — HOSPITAL ENCOUNTER (OUTPATIENT)
Facility: HOSPITAL | Age: 21
Discharge: HOME | End: 2021-11-19
Attending: OTOLARYNGOLOGY | Admitting: OTOLARYNGOLOGY
Payer: MEDICAID

## 2021-11-19 VITALS
OXYGEN SATURATION: 95 % | WEIGHT: 293 LBS | HEART RATE: 88 BPM | HEIGHT: 66 IN | BODY MASS INDEX: 47.09 KG/M2 | RESPIRATION RATE: 16 BRPM | SYSTOLIC BLOOD PRESSURE: 143 MMHG | TEMPERATURE: 99 F | DIASTOLIC BLOOD PRESSURE: 89 MMHG

## 2021-11-19 DIAGNOSIS — J03.01 ACUTE RECURRENT STREPTOCOCCAL TONSILLITIS: Primary | ICD-10-CM

## 2021-11-19 DIAGNOSIS — J35.01 CHRONIC TONSILLITIS: ICD-10-CM

## 2021-11-19 LAB — HCG UR QL IA.RAPID: NEGATIVE

## 2021-11-19 PROCEDURE — 63600175 PHARM REV CODE 636 W HCPCS: Performed by: ANESTHESIOLOGY

## 2021-11-19 PROCEDURE — 37000009 HC ANESTHESIA EA ADD 15 MINS: Performed by: OTOLARYNGOLOGY

## 2021-11-19 PROCEDURE — 27000165 HC TUBE, ETT CUFFED: Performed by: ANESTHESIOLOGY

## 2021-11-19 PROCEDURE — 27000509 HC TUBE SALEM SUMP ANY SIZE: Performed by: ANESTHESIOLOGY

## 2021-11-19 PROCEDURE — 25000003 PHARM REV CODE 250: Performed by: NURSE ANESTHETIST, CERTIFIED REGISTERED

## 2021-11-19 PROCEDURE — 27000655: Performed by: ANESTHESIOLOGY

## 2021-11-19 PROCEDURE — 25000003 PHARM REV CODE 250: Performed by: OTOLARYNGOLOGY

## 2021-11-19 PROCEDURE — 88304 TISSUE EXAM BY PATHOLOGIST: CPT | Mod: 26,XU,, | Performed by: PATHOLOGY

## 2021-11-19 PROCEDURE — 27000716 HC OXISENSOR PROBE, ANY SIZE: Performed by: ANESTHESIOLOGY

## 2021-11-19 PROCEDURE — 88304 TISSUE EXAM BY PATHOLOGIST: CPT | Mod: SUR | Performed by: OTOLARYNGOLOGY

## 2021-11-19 PROCEDURE — 36000707: Performed by: OTOLARYNGOLOGY

## 2021-11-19 PROCEDURE — 27000689 HC BLADE LARYNGOSCOPE ANY SIZE: Performed by: ANESTHESIOLOGY

## 2021-11-19 PROCEDURE — 81025 URINE PREGNANCY TEST: CPT | Performed by: OTOLARYNGOLOGY

## 2021-11-19 PROCEDURE — D9220A PRA ANESTHESIA: ICD-10-PCS | Mod: CRNA,,, | Performed by: NURSE ANESTHETIST, CERTIFIED REGISTERED

## 2021-11-19 PROCEDURE — 00400 ANES INTEGUMENTARY SYS NOS: CPT | Performed by: OTOLARYNGOLOGY

## 2021-11-19 PROCEDURE — 27000260 *HC AIRWAY ORAL: Performed by: ANESTHESIOLOGY

## 2021-11-19 PROCEDURE — 71000015 HC POSTOP RECOV 1ST HR: Performed by: OTOLARYNGOLOGY

## 2021-11-19 PROCEDURE — 71000016 HC POSTOP RECOV ADDL HR: Performed by: OTOLARYNGOLOGY

## 2021-11-19 PROCEDURE — D9220A PRA ANESTHESIA: Mod: ANES,,, | Performed by: ANESTHESIOLOGY

## 2021-11-19 PROCEDURE — 42826 REMOVAL OF TONSILS: CPT | Mod: ,,, | Performed by: OTOLARYNGOLOGY

## 2021-11-19 PROCEDURE — 42826 PR REMOVAL OF TONSILS,12+ Y/O: ICD-10-PCS | Mod: ,,, | Performed by: OTOLARYNGOLOGY

## 2021-11-19 PROCEDURE — D9220A PRA ANESTHESIA: ICD-10-PCS | Mod: ANES,,, | Performed by: ANESTHESIOLOGY

## 2021-11-19 PROCEDURE — 71000033 HC RECOVERY, INTIAL HOUR: Performed by: OTOLARYNGOLOGY

## 2021-11-19 PROCEDURE — 27000510 HC BLANKET BAIR HUGGER ANY SIZE: Performed by: ANESTHESIOLOGY

## 2021-11-19 PROCEDURE — 88304 SURGICAL PATHOLOGY: ICD-10-PCS | Mod: 26,,, | Performed by: PATHOLOGY

## 2021-11-19 PROCEDURE — 36000706: Performed by: OTOLARYNGOLOGY

## 2021-11-19 PROCEDURE — D9220A PRA ANESTHESIA: Mod: CRNA,,, | Performed by: NURSE ANESTHETIST, CERTIFIED REGISTERED

## 2021-11-19 PROCEDURE — 37000008 HC ANESTHESIA 1ST 15 MINUTES: Performed by: OTOLARYNGOLOGY

## 2021-11-19 PROCEDURE — 27201423 OPTIME MED/SURG SUP & DEVICES STERILE SUPPLY: Performed by: OTOLARYNGOLOGY

## 2021-11-19 PROCEDURE — 63600175 PHARM REV CODE 636 W HCPCS: Performed by: NURSE ANESTHETIST, CERTIFIED REGISTERED

## 2021-11-19 RX ORDER — DEXAMETHASONE SODIUM PHOSPHATE 4 MG/ML
INJECTION, SOLUTION INTRA-ARTICULAR; INTRALESIONAL; INTRAMUSCULAR; INTRAVENOUS; SOFT TISSUE
Status: DISCONTINUED | OUTPATIENT
Start: 2021-11-19 | End: 2021-11-19

## 2021-11-19 RX ORDER — MORPHINE SULFATE 10 MG/ML
4 INJECTION INTRAMUSCULAR; INTRAVENOUS; SUBCUTANEOUS EVERY 5 MIN PRN
Status: DISCONTINUED | OUTPATIENT
Start: 2021-11-19 | End: 2021-11-19 | Stop reason: HOSPADM

## 2021-11-19 RX ORDER — SODIUM CHLORIDE, SODIUM LACTATE, POTASSIUM CHLORIDE, CALCIUM CHLORIDE 600; 310; 30; 20 MG/100ML; MG/100ML; MG/100ML; MG/100ML
INJECTION, SOLUTION INTRAVENOUS CONTINUOUS
Status: DISCONTINUED | OUTPATIENT
Start: 2021-11-19 | End: 2021-11-19 | Stop reason: HOSPADM

## 2021-11-19 RX ORDER — SODIUM CHLORIDE, SODIUM LACTATE, POTASSIUM CHLORIDE, CALCIUM CHLORIDE 600; 310; 30; 20 MG/100ML; MG/100ML; MG/100ML; MG/100ML
125 INJECTION, SOLUTION INTRAVENOUS CONTINUOUS
Status: DISCONTINUED | OUTPATIENT
Start: 2021-11-19 | End: 2021-11-19 | Stop reason: HOSPADM

## 2021-11-19 RX ORDER — LIDOCAINE HYDROCHLORIDE 10 MG/ML
1 INJECTION, SOLUTION EPIDURAL; INFILTRATION; INTRACAUDAL; PERINEURAL ONCE
Status: DISCONTINUED | OUTPATIENT
Start: 2021-11-19 | End: 2021-11-19 | Stop reason: HOSPADM

## 2021-11-19 RX ORDER — ONDANSETRON 2 MG/ML
4 INJECTION INTRAMUSCULAR; INTRAVENOUS DAILY PRN
Status: DISCONTINUED | OUTPATIENT
Start: 2021-11-19 | End: 2021-11-19 | Stop reason: HOSPADM

## 2021-11-19 RX ORDER — LIDOCAINE HYDROCHLORIDE 20 MG/ML
INJECTION, SOLUTION EPIDURAL; INFILTRATION; INTRACAUDAL; PERINEURAL
Status: DISCONTINUED | OUTPATIENT
Start: 2021-11-19 | End: 2021-11-19

## 2021-11-19 RX ORDER — FENTANYL CITRATE 50 UG/ML
INJECTION, SOLUTION INTRAMUSCULAR; INTRAVENOUS
Status: DISCONTINUED | OUTPATIENT
Start: 2021-11-19 | End: 2021-11-19

## 2021-11-19 RX ORDER — PROMETHAZINE HYDROCHLORIDE 25 MG/ML
INJECTION, SOLUTION INTRAMUSCULAR; INTRAVENOUS
Status: DISCONTINUED | OUTPATIENT
Start: 2021-11-19 | End: 2021-11-19

## 2021-11-19 RX ORDER — HYDROCODONE BITARTRATE AND ACETAMINOPHEN 7.5; 325 MG/15ML; MG/15ML
15 SOLUTION ORAL EVERY 4 HOURS PRN
Status: DISCONTINUED | OUTPATIENT
Start: 2021-11-19 | End: 2021-11-19 | Stop reason: HOSPADM

## 2021-11-19 RX ORDER — MIDAZOLAM HYDROCHLORIDE 1 MG/ML
INJECTION INTRAMUSCULAR; INTRAVENOUS
Status: DISCONTINUED | OUTPATIENT
Start: 2021-11-19 | End: 2021-11-19

## 2021-11-19 RX ORDER — ROCURONIUM BROMIDE 10 MG/ML
INJECTION, SOLUTION INTRAVENOUS
Status: DISCONTINUED | OUTPATIENT
Start: 2021-11-19 | End: 2021-11-19

## 2021-11-19 RX ORDER — ONDANSETRON 2 MG/ML
INJECTION INTRAMUSCULAR; INTRAVENOUS
Status: DISCONTINUED | OUTPATIENT
Start: 2021-11-19 | End: 2021-11-19

## 2021-11-19 RX ORDER — MEPERIDINE HYDROCHLORIDE 25 MG/ML
25 INJECTION INTRAMUSCULAR; INTRAVENOUS; SUBCUTANEOUS EVERY 10 MIN PRN
Status: DISCONTINUED | OUTPATIENT
Start: 2021-11-19 | End: 2021-11-19 | Stop reason: HOSPADM

## 2021-11-19 RX ORDER — PROPOFOL 10 MG/ML
VIAL (ML) INTRAVENOUS
Status: DISCONTINUED | OUTPATIENT
Start: 2021-11-19 | End: 2021-11-19

## 2021-11-19 RX ORDER — DIPHENHYDRAMINE HYDROCHLORIDE 50 MG/ML
25 INJECTION INTRAMUSCULAR; INTRAVENOUS EVERY 6 HOURS PRN
Status: DISCONTINUED | OUTPATIENT
Start: 2021-11-19 | End: 2021-11-19 | Stop reason: HOSPADM

## 2021-11-19 RX ORDER — HYDROMORPHONE HYDROCHLORIDE 2 MG/ML
0.5 INJECTION, SOLUTION INTRAMUSCULAR; INTRAVENOUS; SUBCUTANEOUS EVERY 5 MIN PRN
Status: COMPLETED | OUTPATIENT
Start: 2021-11-19 | End: 2021-11-19

## 2021-11-19 RX ORDER — SUCCINYLCHOLINE CHLORIDE 20 MG/ML
INJECTION INTRAMUSCULAR; INTRAVENOUS
Status: DISCONTINUED | OUTPATIENT
Start: 2021-11-19 | End: 2021-11-19

## 2021-11-19 RX ADMIN — LIDOCAINE HYDROCHLORIDE 100 MG: 20 INJECTION, SOLUTION INTRAVENOUS at 09:11

## 2021-11-19 RX ADMIN — FENTANYL CITRATE 100 MCG: 50 INJECTION INTRAMUSCULAR; INTRAVENOUS at 09:11

## 2021-11-19 RX ADMIN — PROPOFOL 200 MG: 10 INJECTION, EMULSION INTRAVENOUS at 09:11

## 2021-11-19 RX ADMIN — HYDROMORPHONE HYDROCHLORIDE 0.5 MG: 2 INJECTION INTRAMUSCULAR; INTRAVENOUS; SUBCUTANEOUS at 10:11

## 2021-11-19 RX ADMIN — SUCCINYLCHOLINE CHLORIDE 100 MG: 20 INJECTION, SOLUTION INTRAMUSCULAR; INTRAVENOUS at 09:11

## 2021-11-19 RX ADMIN — ONDANSETRON 8 MG: 2 INJECTION INTRAMUSCULAR; INTRAVENOUS at 09:11

## 2021-11-19 RX ADMIN — SODIUM CHLORIDE, POTASSIUM CHLORIDE, SODIUM LACTATE AND CALCIUM CHLORIDE: 600; 310; 30; 20 INJECTION, SOLUTION INTRAVENOUS at 09:11

## 2021-11-19 RX ADMIN — DEXAMETHASONE SODIUM PHOSPHATE 10 MG: 4 INJECTION, SOLUTION INTRA-ARTICULAR; INTRALESIONAL; INTRAMUSCULAR; INTRAVENOUS; SOFT TISSUE at 09:11

## 2021-11-19 RX ADMIN — SUGAMMADEX 200 MG: 100 INJECTION, SOLUTION INTRAVENOUS at 09:11

## 2021-11-19 RX ADMIN — ROCURONIUM BROMIDE 5 MG: 10 INJECTION, SOLUTION INTRAVENOUS at 09:11

## 2021-11-19 RX ADMIN — MIDAZOLAM 2 MG: 1 INJECTION INTRAMUSCULAR; INTRAVENOUS at 09:11

## 2021-11-19 RX ADMIN — PROMETHAZINE HYDROCHLORIDE 5 MG: 25 INJECTION INTRAMUSCULAR; INTRAVENOUS at 09:11

## 2021-11-22 ENCOUNTER — OFFICE VISIT (OUTPATIENT)
Dept: FAMILY MEDICINE | Facility: CLINIC | Age: 21
End: 2021-11-22
Payer: MEDICAID

## 2021-11-22 VITALS
TEMPERATURE: 97 F | RESPIRATION RATE: 18 BRPM | SYSTOLIC BLOOD PRESSURE: 128 MMHG | HEIGHT: 66 IN | DIASTOLIC BLOOD PRESSURE: 89 MMHG | OXYGEN SATURATION: 99 % | HEART RATE: 78 BPM | WEIGHT: 293 LBS | BODY MASS INDEX: 47.09 KG/M2

## 2021-11-22 DIAGNOSIS — H66.91 RIGHT OTITIS MEDIA, UNSPECIFIED OTITIS MEDIA TYPE: ICD-10-CM

## 2021-11-22 DIAGNOSIS — Z90.89 S/P TONSILLECTOMY: Primary | ICD-10-CM

## 2021-11-22 PROCEDURE — 99214 OFFICE O/P EST MOD 30 MIN: CPT | Mod: ,,, | Performed by: FAMILY MEDICINE

## 2021-11-22 PROCEDURE — 99214 PR OFFICE/OUTPT VISIT, EST, LEVL IV, 30-39 MIN: ICD-10-PCS | Mod: ,,, | Performed by: FAMILY MEDICINE

## 2021-11-22 RX ORDER — CLINDAMYCIN PALMITATE HYDROCHLORIDE (PEDIATRIC) 75 MG/5ML
150 SOLUTION ORAL EVERY 8 HOURS
Qty: 300 ML | Refills: 0 | Status: SHIPPED | OUTPATIENT
Start: 2021-11-22 | End: 2021-11-29

## 2021-11-22 RX ORDER — HYDROCODONE BITARTRATE AND ACETAMINOPHEN 10; 325 MG/1; MG/1
1 TABLET ORAL EVERY 6 HOURS PRN
Qty: 8 TABLET | Refills: 0 | Status: SHIPPED | OUTPATIENT
Start: 2021-11-22 | End: 2022-12-01

## 2021-11-30 ENCOUNTER — OFFICE VISIT (OUTPATIENT)
Dept: OTOLARYNGOLOGY | Facility: CLINIC | Age: 21
End: 2021-11-30
Payer: MEDICAID

## 2021-11-30 VITALS — WEIGHT: 293 LBS | HEIGHT: 66 IN | BODY MASS INDEX: 47.09 KG/M2

## 2021-11-30 DIAGNOSIS — J35.01 CHRONIC TONSILLITIS: Primary | ICD-10-CM

## 2021-11-30 PROCEDURE — 99213 OFFICE O/P EST LOW 20 MIN: CPT | Mod: PBBFAC | Performed by: OTOLARYNGOLOGY

## 2021-11-30 PROCEDURE — 99024 PR POST-OP FOLLOW-UP VISIT: ICD-10-PCS | Mod: ,,, | Performed by: OTOLARYNGOLOGY

## 2021-11-30 PROCEDURE — 99024 POSTOP FOLLOW-UP VISIT: CPT | Mod: ,,, | Performed by: OTOLARYNGOLOGY

## 2022-01-03 PROBLEM — J01.00 ACUTE NON-RECURRENT MAXILLARY SINUSITIS: Status: RESOLVED | Noted: 2021-10-01 | Resolved: 2022-01-03

## 2022-01-12 ENCOUNTER — OFFICE VISIT (OUTPATIENT)
Dept: FAMILY MEDICINE | Facility: CLINIC | Age: 22
End: 2022-01-12
Payer: MEDICAID

## 2022-01-12 VITALS
HEART RATE: 96 BPM | TEMPERATURE: 97 F | WEIGHT: 293 LBS | SYSTOLIC BLOOD PRESSURE: 126 MMHG | RESPIRATION RATE: 16 BRPM | DIASTOLIC BLOOD PRESSURE: 82 MMHG | BODY MASS INDEX: 47.09 KG/M2 | HEIGHT: 66 IN

## 2022-01-12 DIAGNOSIS — Z00.00 ROUTINE GENERAL MEDICAL EXAMINATION AT A HEALTH CARE FACILITY: Primary | ICD-10-CM

## 2022-01-12 PROCEDURE — 3008F BODY MASS INDEX DOCD: CPT | Mod: CPTII,,, | Performed by: FAMILY MEDICINE

## 2022-01-12 PROCEDURE — 99499 PR PHYSICAL - BASIC NON DOT/CDL: ICD-10-PCS | Mod: ,,, | Performed by: FAMILY MEDICINE

## 2022-01-12 PROCEDURE — 1160F RVW MEDS BY RX/DR IN RCRD: CPT | Mod: CPTII,,, | Performed by: FAMILY MEDICINE

## 2022-01-12 PROCEDURE — 99499 UNLISTED E&M SERVICE: CPT | Mod: ,,, | Performed by: FAMILY MEDICINE

## 2022-01-12 PROCEDURE — 3079F PR MOST RECENT DIASTOLIC BLOOD PRESSURE 80-89 MM HG: ICD-10-PCS | Mod: CPTII,,, | Performed by: FAMILY MEDICINE

## 2022-01-12 PROCEDURE — 3079F DIAST BP 80-89 MM HG: CPT | Mod: CPTII,,, | Performed by: FAMILY MEDICINE

## 2022-01-12 PROCEDURE — 1159F PR MEDICATION LIST DOCUMENTED IN MEDICAL RECORD: ICD-10-PCS | Mod: CPTII,,, | Performed by: FAMILY MEDICINE

## 2022-01-12 PROCEDURE — 3074F SYST BP LT 130 MM HG: CPT | Mod: CPTII,,, | Performed by: FAMILY MEDICINE

## 2022-01-12 PROCEDURE — 3008F PR BODY MASS INDEX (BMI) DOCUMENTED: ICD-10-PCS | Mod: CPTII,,, | Performed by: FAMILY MEDICINE

## 2022-01-12 PROCEDURE — 1159F MED LIST DOCD IN RCRD: CPT | Mod: CPTII,,, | Performed by: FAMILY MEDICINE

## 2022-01-12 PROCEDURE — 1160F PR REVIEW ALL MEDS BY PRESCRIBER/CLIN PHARMACIST DOCUMENTED: ICD-10-PCS | Mod: CPTII,,, | Performed by: FAMILY MEDICINE

## 2022-01-12 PROCEDURE — 3074F PR MOST RECENT SYSTOLIC BLOOD PRESSURE < 130 MM HG: ICD-10-PCS | Mod: CPTII,,, | Performed by: FAMILY MEDICINE

## 2022-01-12 NOTE — PROGRESS NOTES
Eric Lee MD        PATIENT NAME: Tulio Chance  : 2000  DATE: 22  MRN: [id number]      Billing Provider: Eric Lee MD  Level of Service: [place] OFFICE/OUTPT VISIT, EST, LEVL II, 10-19 MIN  Patient PCP Information     Provider PCP Type    YANN Milian General          Reason for Visit / Chief Complaint: Annual Exam (School physical)       Update PCP  Update Chief Complaint         History of Present Illness / Problem Focused Workflow     Tulio Chance presents to the clinic with Annual Exam (School physical)     Physical.  In nursing school.        Review of Systems     Review of Systems   Constitutional: Negative for activity change, appetite change, fever and unexpected weight change.   HENT: Negative for congestion, rhinorrhea, sinus pressure, sinus pain, sore throat and trouble swallowing.    Eyes: Negative for photophobia, pain, discharge and visual disturbance.   Respiratory: Negative for cough, chest tightness, wheezing and stridor.    Cardiovascular: Negative for chest pain, palpitations and leg swelling.   Gastrointestinal: Negative for abdominal pain, blood in stool, constipation, diarrhea and nausea.   Endocrine: Negative for polydipsia, polyphagia and polyuria.   Genitourinary: Negative for difficulty urinating, flank pain and hematuria.   Musculoskeletal: Negative for arthralgias and neck pain.   Skin: Negative for rash.   Allergic/Immunologic: Negative for food allergies.   Neurological: Negative for dizziness, tremors, seizures, syncope, weakness (global weakness) and headaches.   Psychiatric/Behavioral: Negative for behavioral problems, confusion, decreased concentration, dysphoric mood and hallucinations. The patient is not nervous/anxious.         Medical / Social / Family History     Past Medical History:   Diagnosis Date    Breast lump     Breast mass        Past Surgical History:   Procedure Laterality Date    APPENDECTOMY      BREAST BIOPSY Right  5/3/2021    Procedure: BIOPSY, BREAST, WITH LUMPECTOMY;  Surgeon: Eva Farmer MD;  Location: [facility] OR;  Service: General;  Laterality: Right;    TONSILLECTOMY Bilateral 11/19/2021    Procedure: TONSILLECTOMY;  Surgeon: Heber Tatum MD;  Location: [facility] OR;  Service: ENT;  Laterality: Bilateral;       Social History  Ms.  reports that she has never smoked. She has never used smokeless tobacco. She reports previous alcohol use. She reports that she does not use drugs.    Family History  Ms.'s family history includes Asthma in her brother; Diabetes in her maternal grandmother; Heart disease in her father and maternal grandfather; Hypertension in her father, maternal grandfather, maternal grandmother, and mother.    Medications and Allergies     Medications  No outpatient medications have been marked as taking for the 1/12/22 encounter (Office Visit) with Eric Lee MD.       Allergies  Review of patient's allergies indicates:  No Known Allergies    Physical Examination     Vitals:    01/12/22 1529   BP: 126/82   Pulse: 96   Resp: 16   Temp: 97.1 °F (36.2 °C)     Physical Exam  Constitutional:       General: She is not in acute distress.     Appearance: Normal appearance.   HENT:      Head: Normocephalic.      Right Ear: Tympanic membrane and ear canal normal.      Left Ear: Tympanic membrane and ear canal normal.      Nose: Nose normal.      Mouth/Throat:      Mouth: Mucous membranes are moist.      Pharynx: No oropharyngeal exudate.   Eyes:      Extraocular Movements: Extraocular movements intact.      Pupils: Pupils are equal, round, and reactive to light.   Cardiovascular:      Rate and Rhythm: Normal rate and regular rhythm.      Heart sounds: No murmur heard.      Pulmonary:      Effort: Pulmonary effort is normal.      Breath sounds: Normal breath sounds. No wheezing.   Abdominal:      General: Abdomen is flat. Bowel sounds are normal.      Palpations: Abdomen is soft.      Hernia: No  hernia is present.   Musculoskeletal:         General: Normal range of motion.      Cervical back: Normal range of motion and neck supple.      Right lower leg: No edema.      Left lower leg: No edema.   Lymphadenopathy:      Cervical: No cervical adenopathy.   Skin:     General: Skin is warm and dry.      Coloration: Skin is not jaundiced.      Findings: No lesion.   Neurological:      General: No focal deficit present.      Mental Status: She is alert and oriented to person, place, and time.      Cranial Nerves: No cranial nerve deficit.      Gait: Gait normal.   Psychiatric:         Mood and Affect: Mood normal.         Behavior: Behavior normal.         Judgment: Judgment normal.          Assessment and Plan (including Health Maintenance)      Problem List  Smart Sets  Document Outside HM   :    Plan:   Routine general medical examination at a health care facility           Health Maintenance Due   Topic Date Due    Hepatitis C Screening  Never done    Lipid Panel  Never done    COVID-19 Vaccine (1) Never done    HPV Vaccines (1 - 2-dose series) Never done    Chlamydia Screening  Never done    TETANUS VACCINE  Never done    Influenza Vaccine (1) Never done       Problem List Items Addressed This Visit    None     Visit Diagnoses     Routine general medical examination at a health care facility    -  Primary          The patient has no Health Maintenance topics of status Not Due    Future Appointments   Date Time Provider Department Center   1/19/2022  4:00 PM NURSE, [facility] FAMILY [facility]IC[facility] Primary   8/25/2022  2:00 PM Jenelle Morales MD [facility]        There are no Patient Instructions on file for this visit.  Follow up if symptoms worsen or fail to improve.     Signature:  Eric Lee MD      Date of encounter: 1/12/22

## 2022-01-28 ENCOUNTER — OFFICE VISIT (OUTPATIENT)
Dept: FAMILY MEDICINE | Facility: CLINIC | Age: 22
End: 2022-01-28
Payer: MEDICAID

## 2022-01-28 VITALS
HEIGHT: 66 IN | TEMPERATURE: 99 F | RESPIRATION RATE: 20 BRPM | SYSTOLIC BLOOD PRESSURE: 124 MMHG | OXYGEN SATURATION: 99 % | BODY MASS INDEX: 45.64 KG/M2 | DIASTOLIC BLOOD PRESSURE: 80 MMHG | HEART RATE: 105 BPM | WEIGHT: 284 LBS

## 2022-01-28 DIAGNOSIS — J02.9 ACUTE PHARYNGITIS, UNSPECIFIED ETIOLOGY: ICD-10-CM

## 2022-01-28 DIAGNOSIS — R50.9 FEVER, UNSPECIFIED FEVER CAUSE: Primary | ICD-10-CM

## 2022-01-28 DIAGNOSIS — Z20.822 EXPOSURE TO COVID-19 VIRUS: ICD-10-CM

## 2022-01-28 LAB
CTP QC/QA: YES
CTP QC/QA: YES
S PYO RRNA THROAT QL PROBE: NEGATIVE
SARS-COV-2 AG RESP QL IA.RAPID: NEGATIVE

## 2022-01-28 PROCEDURE — 87426 SARSCOV CORONAVIRUS AG IA: CPT | Mod: RHCUB | Performed by: FAMILY MEDICINE

## 2022-01-28 PROCEDURE — 3079F PR MOST RECENT DIASTOLIC BLOOD PRESSURE 80-89 MM HG: ICD-10-PCS | Mod: CPTII,,, | Performed by: FAMILY MEDICINE

## 2022-01-28 PROCEDURE — 3008F PR BODY MASS INDEX (BMI) DOCUMENTED: ICD-10-PCS | Mod: CPTII,,, | Performed by: FAMILY MEDICINE

## 2022-01-28 PROCEDURE — 99214 PR OFFICE/OUTPT VISIT, EST, LEVL IV, 30-39 MIN: ICD-10-PCS | Mod: ,,, | Performed by: FAMILY MEDICINE

## 2022-01-28 PROCEDURE — 3079F DIAST BP 80-89 MM HG: CPT | Mod: CPTII,,, | Performed by: FAMILY MEDICINE

## 2022-01-28 PROCEDURE — 3008F BODY MASS INDEX DOCD: CPT | Mod: CPTII,,, | Performed by: FAMILY MEDICINE

## 2022-01-28 PROCEDURE — 99214 OFFICE O/P EST MOD 30 MIN: CPT | Mod: ,,, | Performed by: FAMILY MEDICINE

## 2022-01-28 PROCEDURE — 3074F PR MOST RECENT SYSTOLIC BLOOD PRESSURE < 130 MM HG: ICD-10-PCS | Mod: CPTII,,, | Performed by: FAMILY MEDICINE

## 2022-01-28 PROCEDURE — 3074F SYST BP LT 130 MM HG: CPT | Mod: CPTII,,, | Performed by: FAMILY MEDICINE

## 2022-01-28 PROCEDURE — 87880 STREP A ASSAY W/OPTIC: CPT | Mod: RHCUB | Performed by: FAMILY MEDICINE

## 2022-01-28 RX ORDER — AZITHROMYCIN 250 MG/1
TABLET, FILM COATED ORAL
Qty: 6 TABLET | Refills: 0 | Status: SHIPPED | OUTPATIENT
Start: 2022-01-28 | End: 2022-12-01

## 2022-01-28 NOTE — PROGRESS NOTES
Subjective:       Patient ID: Tulio Chance is a 21 y.o. female.    Chief Complaint: URI (Exposure and throat pain , fever )    Sore throat began yesterday.  Patient says she has had no fever but has had some achiness.    Review of Systems      Objective:      Physical Exam  Constitutional:       Appearance: She is obese. She is not ill-appearing.   HENT:      Nose: Congestion and rhinorrhea present.      Mouth/Throat:      Pharynx: Posterior oropharyngeal erythema present. No oropharyngeal exudate.   Cardiovascular:      Rate and Rhythm: Normal rate and regular rhythm.   Pulmonary:      Effort: Pulmonary effort is normal.      Breath sounds: Normal breath sounds.   Neurological:      Mental Status: She is alert.         Assessment:       Problem List Items Addressed This Visit        Other    Exposure to COVID-19 virus    Relevant Orders    SARS Coronavirus 2 Antigen, POCT (Completed)      Other Visit Diagnoses     Fever, unspecified fever cause    -  Primary    Relevant Orders    POCT rapid strep A (Completed)    Acute pharyngitis, unspecified etiology              Plan:         COVID is negative.  May be too early to pick it up .  Recommend quarantine for 3 more days.  At least

## 2022-02-24 ENCOUNTER — OFFICE VISIT (OUTPATIENT)
Dept: OBSTETRICS AND GYNECOLOGY | Facility: CLINIC | Age: 22
End: 2022-02-24
Payer: MEDICAID

## 2022-02-24 VITALS
HEIGHT: 66 IN | TEMPERATURE: 98 F | SYSTOLIC BLOOD PRESSURE: 130 MMHG | WEIGHT: 293 LBS | HEART RATE: 108 BPM | BODY MASS INDEX: 47.09 KG/M2 | RESPIRATION RATE: 18 BRPM | DIASTOLIC BLOOD PRESSURE: 77 MMHG

## 2022-02-24 DIAGNOSIS — S31.41XA TEAR OF LABIUM, INITIAL ENCOUNTER: Primary | ICD-10-CM

## 2022-02-24 PROCEDURE — 3075F PR MOST RECENT SYSTOLIC BLOOD PRESS GE 130-139MM HG: ICD-10-PCS | Mod: CPTII,,, | Performed by: OBSTETRICS & GYNECOLOGY

## 2022-02-24 PROCEDURE — 99214 PR OFFICE/OUTPT VISIT, EST, LEVL IV, 30-39 MIN: ICD-10-PCS | Mod: ,,, | Performed by: OBSTETRICS & GYNECOLOGY

## 2022-02-24 PROCEDURE — 3078F DIAST BP <80 MM HG: CPT | Mod: CPTII,,, | Performed by: OBSTETRICS & GYNECOLOGY

## 2022-02-24 PROCEDURE — 3008F PR BODY MASS INDEX (BMI) DOCUMENTED: ICD-10-PCS | Mod: CPTII,,, | Performed by: OBSTETRICS & GYNECOLOGY

## 2022-02-24 PROCEDURE — 3008F BODY MASS INDEX DOCD: CPT | Mod: CPTII,,, | Performed by: OBSTETRICS & GYNECOLOGY

## 2022-02-24 PROCEDURE — 3075F SYST BP GE 130 - 139MM HG: CPT | Mod: CPTII,,, | Performed by: OBSTETRICS & GYNECOLOGY

## 2022-02-24 PROCEDURE — 1159F MED LIST DOCD IN RCRD: CPT | Mod: CPTII,,, | Performed by: OBSTETRICS & GYNECOLOGY

## 2022-02-24 PROCEDURE — 99214 OFFICE O/P EST MOD 30 MIN: CPT | Mod: ,,, | Performed by: OBSTETRICS & GYNECOLOGY

## 2022-02-24 PROCEDURE — 3078F PR MOST RECENT DIASTOLIC BLOOD PRESSURE < 80 MM HG: ICD-10-PCS | Mod: CPTII,,, | Performed by: OBSTETRICS & GYNECOLOGY

## 2022-02-24 PROCEDURE — 1159F PR MEDICATION LIST DOCUMENTED IN MEDICAL RECORD: ICD-10-PCS | Mod: CPTII,,, | Performed by: OBSTETRICS & GYNECOLOGY

## 2022-02-24 NOTE — PROGRESS NOTES
History & Physical    SUBJECTIVE:     History of Present Illness:  Patient is a 21 y.o. female presents with skin tag on clitoris.     Chief Complaint   Patient presents with    Skin Tags     Pt reports skin tag in vaginal area       Review of patient's allergies indicates:  No Known Allergies    Current Outpatient Medications   Medication Sig Dispense Refill    amoxicillin-clavulanate 875-125mg (AUGMENTIN) 875-125 mg per tablet Take 1 tablet by mouth 2 (two) times daily. 14 tablet 0    azithromycin (ZITHROMAX Z-DONNA) 250 MG tablet Two today then 1 daily (Patient not taking: Reported on 2/24/2022) 6 tablet 0    chlorpheniramine-phenyleph-DM (ED A-HIST DM) 4-10-10 mg Tab Take 1 tablet by mouth every 6 (six) hours as needed (cough or congestion). (Patient not taking: No sig reported) 30 tablet 0    HYDROcodone-acetaminophen (NORCO)  mg per tablet Take 1 tablet by mouth every 6 (six) hours as needed for Pain. (Patient not taking: Reported on 2/24/2022) 8 tablet 0    methylPREDNISolone (MEDROL DOSEPACK) 4 mg tablet use as directed (Patient not taking: No sig reported) 1 Package 0    norethindrone-ethinyl estradiol (MICROGESTIN 1/20) 1-20 mg-mcg per tablet Take 1 tablet by mouth once daily. (Patient not taking: Reported on 11/22/2021) 30 tablet 11     No current facility-administered medications for this visit.       Past Medical History:   Diagnosis Date    Breast lump     Breast mass      Past Surgical History:   Procedure Laterality Date    APPENDECTOMY      BREAST BIOPSY Right 5/3/2021    Procedure: BIOPSY, BREAST, WITH LUMPECTOMY;  Surgeon: Eva Farmer MD;  Location: [facility] OR;  Service: General;  Laterality: Right;    TONSILLECTOMY Bilateral 11/19/2021    Procedure: TONSILLECTOMY;  Surgeon: Heber Tatum MD;  Location: [facility] OR;  Service: ENT;  Laterality: Bilateral;     Family History   Problem Relation Age of Onset    Hypertension Mother     Heart disease Father     Hypertension  "Father     Asthma Brother     Diabetes Maternal Grandmother     Hypertension Maternal Grandmother     Heart disease Maternal Grandfather     Hypertension Maternal Grandfather      Social History     Tobacco Use    Smoking status: Never Smoker    Smokeless tobacco: Never Used   Substance Use Topics    Alcohol use: Not Currently    Drug use: Never        Review of Systems:  Review of Systems   Constitutional: Negative for appetite change, chills, fatigue and fever.   HENT: Negative.    Eyes: Negative.    Respiratory: Negative for cough, chest tightness and shortness of breath.    Cardiovascular: Negative for chest pain, palpitations and leg swelling.   Gastrointestinal: Negative for abdominal distention, abdominal pain, blood in stool, constipation, diarrhea, nausea and vomiting.   Endocrine: Negative for cold intolerance, heat intolerance, polydipsia, polyphagia and polyuria.   Genitourinary: Negative for difficulty urinating, dyspareunia, dysuria, flank pain, frequency, pelvic pain, urgency, vaginal bleeding, vaginal discharge and vaginal pain.   Musculoskeletal: Negative.    Skin: Negative.    Neurological: Negative.    Psychiatric/Behavioral: Negative.  Negative for agitation, behavioral problems, confusion and sleep disturbance. The patient is not nervous/anxious.        OBJECTIVE:     Vital Signs (Most Recent)  Temp: 97.8 °F (36.6 °C) (02/24/22 1416)  Pulse: 108 (02/24/22 1416)  Resp: 18 (02/24/22 1416)  BP: 130/77 (02/24/22 1416)  5' 6" (1.676 m)  (!) 138.8 kg (306 lb)     Physical Exam:  Physical Exam  Vitals reviewed. Exam conducted with a chaperone present.   Constitutional:       Appearance: Normal appearance.   HENT:      Head: Normocephalic and atraumatic.      Mouth/Throat:      Mouth: Mucous membranes are moist.   Eyes:      Extraocular Movements: Extraocular movements intact.      Pupils: Pupils are equal, round, and reactive to light.   Cardiovascular:      Rate and Rhythm: Normal rate and " regular rhythm.      Pulses: Normal pulses.      Heart sounds: Normal heart sounds.   Pulmonary:      Effort: Pulmonary effort is normal.      Breath sounds: Normal breath sounds.   Abdominal:      General: Abdomen is flat. Bowel sounds are normal.      Palpations: Abdomen is soft.   Genitourinary:     General: Normal vulva.      Exam position: Lithotomy position.       Musculoskeletal:         General: Normal range of motion.      Cervical back: Normal range of motion.   Skin:     General: Skin is warm and dry.   Neurological:      General: No focal deficit present.      Mental Status: She is alert and oriented to person, place, and time.   Psychiatric:         Mood and Affect: Mood normal.         Behavior: Behavior normal.         Thought Content: Thought content normal.         Judgment: Judgment normal.             ASSESSMENT/PLAN:   Tulio was seen today for skin tags.    Diagnoses and all orders for this visit:    Tear of labium, initial encounter        PLAN:Plan      Pt reassured of findings with no intervention recommended.

## 2022-03-23 ENCOUNTER — HOSPITAL ENCOUNTER (EMERGENCY)
Facility: HOSPITAL | Age: 22
Discharge: HOME | End: 2022-03-23
Attending: FAMILY MEDICINE | Admitting: FAMILY MEDICINE
Payer: MEDICAID

## 2022-03-23 VITALS
HEIGHT: 67 IN | RESPIRATION RATE: 22 BRPM | TEMPERATURE: 98 F | OXYGEN SATURATION: 96 % | BODY MASS INDEX: 39.24 KG/M2 | SYSTOLIC BLOOD PRESSURE: 131 MMHG | DIASTOLIC BLOOD PRESSURE: 97 MMHG | HEART RATE: 118 BPM | WEIGHT: 250 LBS

## 2022-03-23 DIAGNOSIS — R55 SYNCOPE: ICD-10-CM

## 2022-03-23 DIAGNOSIS — R06.02 SHORTNESS OF BREATH: ICD-10-CM

## 2022-03-23 DIAGNOSIS — J45.20 MILD INTERMITTENT ASTHMA WITHOUT COMPLICATION: Primary | ICD-10-CM

## 2022-03-23 LAB
ALBUMIN SERPL BCP-MCNC: 3.5 G/DL (ref 3.5–5)
ALBUMIN/GLOB SERPL: 0.8 {RATIO}
ALP SERPL-CCNC: 63 U/L (ref 52–144)
ALT SERPL W P-5'-P-CCNC: 31 U/L (ref 13–56)
ANION GAP SERPL CALCULATED.3IONS-SCNC: 16 MMOL/L (ref 7–16)
AST SERPL W P-5'-P-CCNC: 17 U/L (ref 15–37)
BASOPHILS # BLD AUTO: 0.02 K/UL (ref 0–0.2)
BASOPHILS NFR BLD AUTO: 0.3 % (ref 0–1)
BILIRUB SERPL-MCNC: 0.2 MG/DL (ref 0–1.2)
BUN SERPL-MCNC: 11 MG/DL (ref 7–18)
BUN/CREAT SERPL: 12 (ref 6–20)
CALCIUM SERPL-MCNC: 8.5 MG/DL (ref 8.5–10.1)
CHLORIDE SERPL-SCNC: 99 MMOL/L (ref 98–107)
CO2 SERPL-SCNC: 20 MMOL/L (ref 21–32)
CREAT SERPL-MCNC: 0.9 MG/DL (ref 0.55–1.02)
DIFFERENTIAL METHOD BLD: ABNORMAL
EOSINOPHIL # BLD AUTO: 0.26 K/UL (ref 0–0.5)
EOSINOPHIL NFR BLD AUTO: 3.3 % (ref 1–4)
ERYTHROCYTE [DISTWIDTH] IN BLOOD BY AUTOMATED COUNT: 15.5 % (ref 11.5–14.5)
GLOBULIN SER-MCNC: 4.2 G/DL (ref 2–4)
GLUCOSE SERPL-MCNC: 114 MG/DL (ref 74–106)
HCT VFR BLD AUTO: 36.9 % (ref 38–47)
HGB BLD-MCNC: 12.1 G/DL (ref 12–16)
LYMPHOCYTES # BLD AUTO: 1.61 K/UL (ref 1–4.8)
LYMPHOCYTES NFR BLD AUTO: 20.6 % (ref 27–41)
MCH RBC QN AUTO: 28 PG (ref 27–31)
MCHC RBC AUTO-ENTMCNC: 32.8 G/DL (ref 32–36)
MCV RBC AUTO: 85.4 FL (ref 80–96)
MONOCYTES # BLD AUTO: 0.84 K/UL (ref 0–0.8)
MONOCYTES NFR BLD AUTO: 10.8 % (ref 2–6)
MPC BLD CALC-MCNC: 10.8 FL (ref 9.4–12.4)
NEUTROPHILS # BLD AUTO: 5.08 K/UL (ref 1.8–7.7)
NEUTROPHILS NFR BLD AUTO: 65 % (ref 53–65)
PLATELET # BLD AUTO: 360 K/UL (ref 150–400)
POTASSIUM SERPL-SCNC: 3.6 MMOL/L (ref 3.5–5.1)
PROT SERPL-MCNC: 7.7 G/DL (ref 6.4–8.2)
RBC # BLD AUTO: 4.32 M/UL (ref 4.2–5.4)
SODIUM SERPL-SCNC: 131 MMOL/L (ref 136–145)
WBC # BLD AUTO: 7.81 K/UL (ref 4.5–11)

## 2022-03-23 PROCEDURE — 93010 EKG 12-LEAD: ICD-10-PCS | Mod: ,,, | Performed by: FAMILY MEDICINE

## 2022-03-23 PROCEDURE — 80053 COMPREHEN METABOLIC PANEL: CPT | Performed by: FAMILY MEDICINE

## 2022-03-23 PROCEDURE — 63600175 PHARM REV CODE 636 W HCPCS: Performed by: FAMILY MEDICINE

## 2022-03-23 PROCEDURE — 96375 TX/PRO/DX INJ NEW DRUG ADDON: CPT

## 2022-03-23 PROCEDURE — 25000003 PHARM REV CODE 250: Performed by: FAMILY MEDICINE

## 2022-03-23 PROCEDURE — 99285 EMERGENCY DEPT VISIT HI MDM: CPT | Mod: 25

## 2022-03-23 PROCEDURE — 96374 THER/PROPH/DIAG INJ IV PUSH: CPT

## 2022-03-23 PROCEDURE — 93005 ELECTROCARDIOGRAM TRACING: CPT

## 2022-03-23 PROCEDURE — 93010 ELECTROCARDIOGRAM REPORT: CPT | Mod: ,,, | Performed by: FAMILY MEDICINE

## 2022-03-23 PROCEDURE — 94640 AIRWAY INHALATION TREATMENT: CPT | Mod: XB

## 2022-03-23 PROCEDURE — 85025 COMPLETE CBC W/AUTO DIFF WBC: CPT | Performed by: FAMILY MEDICINE

## 2022-03-23 PROCEDURE — 99284 EMERGENCY DEPT VISIT MOD MDM: CPT | Mod: ,,, | Performed by: FAMILY MEDICINE

## 2022-03-23 PROCEDURE — 25000242 PHARM REV CODE 250 ALT 637 W/ HCPCS: Performed by: FAMILY MEDICINE

## 2022-03-23 PROCEDURE — 94761 N-INVAS EAR/PLS OXIMETRY MLT: CPT

## 2022-03-23 PROCEDURE — 36415 COLL VENOUS BLD VENIPUNCTURE: CPT | Performed by: FAMILY MEDICINE

## 2022-03-23 PROCEDURE — 99284 PR EMERGENCY DEPT VISIT,LEVEL IV: ICD-10-PCS | Mod: ,,, | Performed by: FAMILY MEDICINE

## 2022-03-23 RX ORDER — METOPROLOL TARTRATE 1 MG/ML
2.5 INJECTION, SOLUTION INTRAVENOUS
Status: COMPLETED | OUTPATIENT
Start: 2022-03-23 | End: 2022-03-23

## 2022-03-23 RX ORDER — DEXAMETHASONE SODIUM PHOSPHATE 4 MG/ML
8 INJECTION, SOLUTION INTRA-ARTICULAR; INTRALESIONAL; INTRAMUSCULAR; INTRAVENOUS; SOFT TISSUE
Status: COMPLETED | OUTPATIENT
Start: 2022-03-23 | End: 2022-03-23

## 2022-03-23 RX ORDER — CLONIDINE HYDROCHLORIDE 0.1 MG/1
0.1 TABLET ORAL
Status: DISCONTINUED | OUTPATIENT
Start: 2022-03-23 | End: 2022-03-23

## 2022-03-23 RX ORDER — ALBUTEROL SULFATE 90 UG/1
1-2 AEROSOL, METERED RESPIRATORY (INHALATION) EVERY 6 HOURS PRN
Qty: 18 G | Refills: 2 | Status: SHIPPED | OUTPATIENT
Start: 2022-03-23 | End: 2022-12-22 | Stop reason: SDUPTHER

## 2022-03-23 RX ORDER — METHYLPREDNISOLONE 4 MG/1
TABLET ORAL
Qty: 21 EACH | Refills: 0 | Status: SHIPPED | OUTPATIENT
Start: 2022-03-23 | End: 2022-04-13

## 2022-03-23 RX ORDER — IPRATROPIUM BROMIDE AND ALBUTEROL SULFATE 2.5; .5 MG/3ML; MG/3ML
3 SOLUTION RESPIRATORY (INHALATION)
Status: COMPLETED | OUTPATIENT
Start: 2022-03-23 | End: 2022-03-23

## 2022-03-23 RX ADMIN — METOPROLOL TARTRATE 2.5 MG: 5 INJECTION INTRAVENOUS at 09:03

## 2022-03-23 RX ADMIN — DEXAMETHASONE SODIUM PHOSPHATE 8 MG: 4 INJECTION, SOLUTION INTRAMUSCULAR; INTRAVENOUS at 09:03

## 2022-03-23 RX ADMIN — IPRATROPIUM BROMIDE AND ALBUTEROL SULFATE 3 ML: 2.5; .5 SOLUTION RESPIRATORY (INHALATION) at 08:03

## 2022-03-23 RX ADMIN — IPRATROPIUM BROMIDE AND ALBUTEROL SULFATE 3 ML: 2.5; .5 SOLUTION RESPIRATORY (INHALATION) at 09:03

## 2022-03-23 NOTE — Clinical Note
"Tulio Hopedaniella" Robson was seen and treated in our emergency department on 3/23/2022.  She may return to school on 03/24/2022.      If you have any questions or concerns, please don't hesitate to call.      Guilherme Finn, DO"

## 2022-03-23 NOTE — ED TRIAGE NOTES
States all started yest, was at school today and nearly passed out at school today with shortness of breath

## 2022-03-23 NOTE — ED PROVIDER NOTES
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        - - - - - - - = - = - = - = - = - - -====    Near sycaopal  Episode                   Review of patient's allergies indicates:  No Known Allergies  Past Medical History:   Diagnosis Date    Breast lump     Breast mass      Past Surgical History:   Procedure Laterality Date    APPENDECTOMY      BREAST BIOPSY Right 5/3/2021    Procedure: BIOPSY, BREAST, WITH LUMPECTOMY;  Surgeon: Eva Farmer MD;  Location: [facility];  Service: General;  Laterality: Right;    TONSILLECTOMY Bilateral 11/19/2021    Procedure: TONSILLECTOMY;  Surgeon: Heber Tatum MD;  Location: [facility];  Service: ENT;  Laterality: Bilateral;     Family History   Problem Relation Age of Onset    Hypertension Mother     Heart disease Father     Hypertension Father     Asthma Brother     Diabetes Maternal Grandmother     Hypertension Maternal Grandmother     Heart disease Maternal Grandfather     Hypertension Maternal Grandfather      Social History     Tobacco Use    Smoking status: Never Smoker    Smokeless tobacco: Never Used   Substance Use Topics    Alcohol use: Not Currently    Drug use: Never     Review of Systems   Constitutional: Negative for fever.   HENT: Negative for sore throat.    Respiratory: Positive for shortness of breath and wheezing.    Cardiovascular: Negative for chest pain.   Gastrointestinal: Negative for nausea.   Genitourinary: Negative for dysuria.   Musculoskeletal: Negative for back pain.   Skin: Negative for rash.   Neurological: Negative for weakness.   Hematological: Does not bruise/bleed easily.       Physical Exam     Initial Vitals   BP Pulse Resp Temp SpO2   03/23/22 0839 03/23/22 0839 03/23/22 0839 03/23/22 0839 03/23/22 0850   (!)  152/108 (!) 140 (!) 28 98.2 °F (36.8 °C) (!) 92 %      MAP       --                Physical Exam    Constitutional: She appears well-developed and well-nourished.   HENT:   Head: Normocephalic and atraumatic.   Right Ear: External ear normal.   Left Ear: External ear normal.   Nose: Nose normal.   Mouth/Throat: Oropharynx is clear and moist.   Eyes: Conjunctivae and EOM are normal. Pupils are equal, round, and reactive to light.   Neck: Neck supple.   Normal range of motion.  Cardiovascular: Normal rate, regular rhythm, normal heart sounds and intact distal pulses.   Pulmonary/Chest: She has wheezes.   Abdominal: Abdomen is soft. Bowel sounds are normal.   Genitourinary:    Vagina and uterus normal.     Musculoskeletal:         General: Normal range of motion.      Cervical back: Normal range of motion and neck supple.     Neurological: She is alert and oriented to person, place, and time. She has normal strength and normal reflexes.   Skin: Skin is warm. Capillary refill takes less than 2 seconds.   Psychiatric: She has a normal mood and affect. Her behavior is normal. Judgment and thought content normal.         Medical Screening Exam   See Full Note    ED Course   Procedures  Labs Reviewed   COMPREHENSIVE METABOLIC PANEL - Abnormal; Notable for the following components:       Result Value    Sodium 131 (*)     CO2 20 (*)     Glucose 114 (*)     Globulin 4.2 (*)     All other components within normal limits   CBC WITH DIFFERENTIAL - Abnormal; Notable for the following components:    Hematocrit 36.9 (*)     RDW 15.5 (*)     Lymphocytes % 20.6 (*)     Monocytes % 10.8 (*)     Monocytes, Absolute 0.84 (*)     All other components within normal limits   CBC W/ AUTO DIFFERENTIAL    Narrative:     The following orders were created for panel order CBC auto differential.  Procedure                               Abnormality         Status                     ---------                               -----------          ------                     CBC with Differential[[id number]]        Abnormal            Final result                 Please view results for these tests on the individual orders.        ECG Results          EKG 12-lead (In process)  Result time 03/24/22 14:07:09    In process by Interface, Lab In [facility] (03/24/22 14:07:09)                 Narrative:    Test Reason : R55,    Vent. Rate : 123 BPM     Atrial Rate : 123 BPM     P-R Int : 128 ms          QRS Dur : 072 ms      QT Int : 318 ms       P-R-T Axes : 013 052 058 degrees     QTc Int : 455 ms    Sinus tachycardia  Nonspecific T wave abnormality  Abnormal ECG  No previous ECGs available    Referred By: [email or web address]   SELF           Confirmed By:                             Imaging Results          X-Ray Chest 1 View (Final result)  Result time 03/23/22 09:27:28    Final result by Juan Pablo Eaton DO (03/23/22 09:27:28)                 Impression:      No acute cardiopulmonary process demonstrated.    Point of Service: [facility]      Electronically signed by: Juan Pablo Eaton  Date:    03/23/2022  Time:    09:27             Narrative:    EXAMINATION:  XR CHEST 1 VIEW    CLINICAL HISTORY:  Shortness of breath    COMPARISON:  Chest x-ray November 22, 2019    TECHNIQUE:  Frontal view/views of the chest.    FINDINGS:  Heart size appears within normal limits.  No focal consolidation, pleural effusion, or pneumothorax.  Visualized osseous and surrounding soft tissue structures demonstrate no acute abnormality.                                 Medications   dexamethasone injection 8 mg (8 mg Intravenous Given 3/23/22 0900)   albuterol-ipratropium 2.5 mg-0.5 mg/3 mL nebulizer solution 3 mL (3 mLs Nebulization Given 3/23/22 0901)   metoprolol injection 2.5 mg (2.5 mg Intravenous Given 3/23/22 0907)                       Clinical Impression:   Final diagnoses:  [J45.20] Mild intermittent asthma without complication (Primary)          ED Disposition Condition     Discharge Stable        ED Prescriptions     Medication Sig Dispense Start Date End Date Auth. Provider    methylPREDNISolone (MEDROL DOSEPACK) 4 mg tablet use as directed 21 each 3/23/2022 4/13/2022 Guilherme Finn DO    albuterol (PROVENTIL/VENTOLIN HFA) 90 mcg/actuation inhaler Inhale 1-2 puffs into the lungs every 6 (six) hours as needed for Wheezing. Rescue 18 g 3/23/2022 3/23/2023 Guilherme Finn DO        Follow-up Information    None          Guilherme Finn DO  03/28/22 [id number]

## 2022-03-25 ENCOUNTER — TELEPHONE (OUTPATIENT)
Dept: EMERGENCY MEDICINE | Facility: HOSPITAL | Age: 22
End: 2022-03-25
Payer: MEDICAID

## 2022-05-15 ENCOUNTER — HOSPITAL ENCOUNTER (EMERGENCY)
Facility: HOSPITAL | Age: 22
Discharge: HOME | End: 2022-05-15
Payer: MEDICAID

## 2022-05-15 VITALS
SYSTOLIC BLOOD PRESSURE: 133 MMHG | OXYGEN SATURATION: 99 % | RESPIRATION RATE: 18 BRPM | HEART RATE: 95 BPM | HEIGHT: 67 IN | WEIGHT: 292 LBS | BODY MASS INDEX: 45.83 KG/M2 | TEMPERATURE: 98 F | DIASTOLIC BLOOD PRESSURE: 76 MMHG

## 2022-05-15 DIAGNOSIS — S09.90XA INJURY OF HEAD, INITIAL ENCOUNTER: Primary | ICD-10-CM

## 2022-05-15 PROCEDURE — 63600175 PHARM REV CODE 636 W HCPCS: Performed by: NURSE PRACTITIONER

## 2022-05-15 PROCEDURE — 99283 EMERGENCY DEPT VISIT LOW MDM: CPT | Mod: ,,, | Performed by: NURSE PRACTITIONER

## 2022-05-15 PROCEDURE — 96372 THER/PROPH/DIAG INJ SC/IM: CPT

## 2022-05-15 PROCEDURE — 99284 EMERGENCY DEPT VISIT MOD MDM: CPT

## 2022-05-15 PROCEDURE — 99283 PR EMERGENCY DEPT VISIT,LEVEL III: ICD-10-PCS | Mod: ,,, | Performed by: NURSE PRACTITIONER

## 2022-05-15 RX ORDER — KETOROLAC TROMETHAMINE 30 MG/ML
30 INJECTION, SOLUTION INTRAMUSCULAR; INTRAVENOUS
Status: COMPLETED | OUTPATIENT
Start: 2022-05-15 | End: 2022-05-15

## 2022-05-15 RX ADMIN — KETOROLAC TROMETHAMINE 30 MG: 30 INJECTION, SOLUTION INTRAMUSCULAR at 11:05

## 2022-05-15 NOTE — DISCHARGE INSTRUCTIONS
Follow up with your primary care provider in 24 to 48 hours if worsening or not improving.  Tylenol/ibuprofen as directed.  Drink plenty of water.  Return to the ER as needed.

## 2022-05-15 NOTE — Clinical Note
"Tulio Hopedaniella" Robson was seen and treated in our emergency department on 5/15/2022.  She may return to work on 05/16/2022.       If you have any questions or concerns, please don't hesitate to call.      YANN Garnica"

## 2022-05-15 NOTE — ED TRIAGE NOTES
"C/o was playing on slip n slide yesterday and hit heads with someone else. States she has a "knot" on left side of forehead and she went to sleep last night at 7 pm and slept all night and is still drowsy. States vomited x 1.   "

## 2022-05-15 NOTE — ED PROVIDER NOTES
Encounter Date: 5/15/2022       History     Chief Complaint   Patient presents with    Headache     Drowsy       Patient presents with a headache after head butting another individual on a slip and slide yesterday.  No loss of consciousness or change in mental status.  No neuro or motor deficits.  After the injury, the patient continued to play on the slip and slide.  He reports this morning around 0300 she had an episode of vomiting and diarrhea.  Resolved.  Denies abdominal pain or nausea at present.  Pain is left frontal, the point of impact.  She has a very small contusion above the left eyebrow.  She also complains of lateral neck pain with movement.        Review of patient's allergies indicates:  No Known Allergies  Past Medical History:   Diagnosis Date    Breast lump     Breast mass      Past Surgical History:   Procedure Laterality Date    APPENDECTOMY      BREAST BIOPSY Right 5/3/2021    Procedure: BIOPSY, BREAST, WITH LUMPECTOMY;  Surgeon: Eva Farmer MD;  Location: [facility] OR;  Service: General;  Laterality: Right;    TONSILLECTOMY Bilateral 11/19/2021    Procedure: TONSILLECTOMY;  Surgeon: Heber Tatum MD;  Location: [facility] OR;  Service: ENT;  Laterality: Bilateral;     Family History   Problem Relation Age of Onset    Hypertension Mother     Heart disease Father     Hypertension Father     Asthma Brother     Diabetes Maternal Grandmother     Hypertension Maternal Grandmother     Heart disease Maternal Grandfather     Hypertension Maternal Grandfather      Social History     Tobacco Use    Smoking status: Never Smoker    Smokeless tobacco: Never Used   Substance Use Topics    Alcohol use: Not Currently    Drug use: Never     Review of Systems   Constitutional: Negative for fever.   HENT: Negative for trouble swallowing.    Eyes: Negative for visual disturbance.   Respiratory: Negative for shortness of breath.    Cardiovascular: Negative for chest pain, palpitations and  leg swelling.   Gastrointestinal: Positive for diarrhea (Resolved) and vomiting ( resolved). Negative for abdominal pain and nausea.   Genitourinary: Negative for decreased urine volume, dysuria, frequency and hematuria.   Musculoskeletal: Positive for neck pain.   Skin: Negative for color change.   Neurological: Positive for headaches. Negative for dizziness and numbness.       Physical Exam     Initial Vitals [05/15/22 1051]   BP Pulse Resp Temp SpO2   133/76 95 18 98.2 °F (36.8 °C) 99 %      MAP       --         Physical Exam    Nursing note and vitals reviewed.  Constitutional: No distress.   HENT:   Head: Normocephalic and atraumatic.   Eyes: EOM are normal. Pupils are equal, round, and reactive to light.   Neck: Neck supple.   Cardiovascular: Normal rate, regular rhythm and normal heart sounds.   Pulmonary/Chest: Breath sounds normal. No respiratory distress.   Abdominal: Abdomen is soft. There is no abdominal tenderness.   Musculoskeletal:         General: Normal range of motion.      Cervical back: Neck supple.     Neurological: She is alert and oriented to person, place, and time. No cranial nerve deficit. GCS score is 15. GCS eye subscore is 4. GCS verbal subscore is 5. GCS motor subscore is 6.   Small contusion above the left eyebrow without underlying crepitus   Skin: Skin is warm and dry. Capillary refill takes less than 2 seconds.         Medical Screening Exam   See Full Note    ED Course   Procedures  Labs Reviewed - No data to display       Imaging Results    None          Medications   ketorolac injection 30 mg (has no administration in time range)                       Clinical Impression:   Final diagnoses:  [S09.90XA] Injury of head, initial encounter (Primary)          ED Disposition Condition    Discharge Stable        ED Prescriptions     None        Follow-up Information    None          Dani López, YANN  05/15/22 1104

## 2022-05-16 ENCOUNTER — TELEPHONE (OUTPATIENT)
Dept: EMERGENCY MEDICINE | Facility: HOSPITAL | Age: 22
End: 2022-05-16
Payer: MEDICAID

## 2022-07-19 ENCOUNTER — APPOINTMENT (OUTPATIENT)
Dept: LAB | Facility: CLINIC | Age: 22
End: 2022-07-19
Payer: MEDICAID

## 2022-07-19 DIAGNOSIS — Z20.822 EXPOSURE TO COVID-19 VIRUS: Primary | ICD-10-CM

## 2022-07-19 LAB
CTP QC/QA: YES
FLUAV AG NPH QL: NEGATIVE
FLUBV AG NPH QL: NEGATIVE
SARS-COV-2 AG RESP QL IA.RAPID: NEGATIVE

## 2022-07-19 PROCEDURE — 87428 SARSCOV & INF VIR A&B AG IA: CPT | Mod: RHCUB | Performed by: NURSE PRACTITIONER

## 2022-07-28 ENCOUNTER — APPOINTMENT (OUTPATIENT)
Dept: LAB | Facility: CLINIC | Age: 22
End: 2022-07-28
Payer: MEDICAID

## 2022-07-28 DIAGNOSIS — Z11.52 ENCOUNTER FOR SCREENING FOR COVID-19: Primary | ICD-10-CM

## 2022-07-28 LAB
CTP QC/QA: YES
SARS-COV-2 AG RESP QL IA.RAPID: NEGATIVE

## 2022-07-28 PROCEDURE — 87426 SARSCOV CORONAVIRUS AG IA: CPT | Mod: RHCUB | Performed by: FAMILY MEDICINE

## 2022-09-15 ENCOUNTER — OFFICE VISIT (OUTPATIENT)
Dept: OBSTETRICS AND GYNECOLOGY | Facility: CLINIC | Age: 22
End: 2022-09-15
Payer: MEDICAID

## 2022-09-15 VITALS
HEART RATE: 83 BPM | HEIGHT: 67 IN | SYSTOLIC BLOOD PRESSURE: 113 MMHG | WEIGHT: 290 LBS | DIASTOLIC BLOOD PRESSURE: 69 MMHG | BODY MASS INDEX: 45.52 KG/M2

## 2022-09-15 DIAGNOSIS — Z01.419 GYNECOLOGIC EXAM NORMAL: Primary | ICD-10-CM

## 2022-09-15 PROCEDURE — 3074F SYST BP LT 130 MM HG: CPT | Mod: CPTII,,, | Performed by: OBSTETRICS & GYNECOLOGY

## 2022-09-15 PROCEDURE — 3074F PR MOST RECENT SYSTOLIC BLOOD PRESSURE < 130 MM HG: ICD-10-PCS | Mod: CPTII,,, | Performed by: OBSTETRICS & GYNECOLOGY

## 2022-09-15 PROCEDURE — 99395 PR PREVENTIVE VISIT,EST,18-39: ICD-10-PCS | Mod: ,,, | Performed by: OBSTETRICS & GYNECOLOGY

## 2022-09-15 PROCEDURE — 99395 PREV VISIT EST AGE 18-39: CPT | Mod: ,,, | Performed by: OBSTETRICS & GYNECOLOGY

## 2022-09-15 PROCEDURE — 1159F PR MEDICATION LIST DOCUMENTED IN MEDICAL RECORD: ICD-10-PCS | Mod: CPTII,,, | Performed by: OBSTETRICS & GYNECOLOGY

## 2022-09-15 PROCEDURE — 3078F DIAST BP <80 MM HG: CPT | Mod: CPTII,,, | Performed by: OBSTETRICS & GYNECOLOGY

## 2022-09-15 PROCEDURE — 3078F PR MOST RECENT DIASTOLIC BLOOD PRESSURE < 80 MM HG: ICD-10-PCS | Mod: CPTII,,, | Performed by: OBSTETRICS & GYNECOLOGY

## 2022-09-15 PROCEDURE — 1159F MED LIST DOCD IN RCRD: CPT | Mod: CPTII,,, | Performed by: OBSTETRICS & GYNECOLOGY

## 2022-09-15 PROCEDURE — 3008F PR BODY MASS INDEX (BMI) DOCUMENTED: ICD-10-PCS | Mod: CPTII,,, | Performed by: OBSTETRICS & GYNECOLOGY

## 2022-09-15 PROCEDURE — 3008F BODY MASS INDEX DOCD: CPT | Mod: CPTII,,, | Performed by: OBSTETRICS & GYNECOLOGY

## 2022-09-15 NOTE — PROGRESS NOTES
"CC: Well woman exam    Tulio Chance is a 21 y.o. female  presents for well woman exam.  LMP: Patient's last menstrual period was 2022..  Pt states her left breast there is a lump and would like to have it examined/ Pt reports having STD check at [facility] last month/    Past Medical History:   Diagnosis Date    Breast lump     Breast mass      Past Surgical History:   Procedure Laterality Date    APPENDECTOMY      BREAST BIOPSY Right 5/3/2021    Procedure: BIOPSY, BREAST, WITH LUMPECTOMY;  Surgeon: Eva Farmer MD;  Location: [facility];  Service: General;  Laterality: Right;    TONSILLECTOMY Bilateral 2021    Procedure: TONSILLECTOMY;  Surgeon: Heber Tatum MD;  Location: [facility];  Service: ENT;  Laterality: Bilateral;     Social History     Socioeconomic History    Marital status: Single   Tobacco Use    Smoking status: Never    Smokeless tobacco: Never   Substance and Sexual Activity    Alcohol use: Not Currently    Drug use: Never    Sexual activity: Not Currently     Partners: Male     Birth control/protection: None     Family History   Problem Relation Age of Onset    Hypertension Mother     Heart disease Father     Hypertension Father     Asthma Brother     Diabetes Maternal Grandmother     Hypertension Maternal Grandmother     Heart disease Maternal Grandfather     Hypertension Maternal Grandfather      OB History          0    Para   0    Term   0       0    AB   0    Living   0         SAB   0    IAB   0    Ectopic   0    Multiple   0    Live Births   0                 /69   Pulse 83   Ht 5' 7" (1.702 m)   Wt 131.5 kg (290 lb)   LMP 2022   BMI 45.42 kg/m²       ROS:  GENERAL: Denies weight gain or weight loss. Feeling well overall.   SKIN: Denies rash or lesions.   HEAD: Denies head injury or headache.   NODES: Denies enlarged lymph nodes.   CHEST: Denies chest pain or shortness of breath.   CARDIOVASCULAR: Denies palpitations or left sided " chest pain.   ABDOMEN: No abdominal pain, constipation, diarrhea, nausea, vomiting or rectal bleeding.   URINARY: No frequency, dysuria, hematuria, or burning on urination.  REPRODUCTIVE: See HPI.   BREASTS: The patient performs breast self-examination and denies pain, lumps, or nipple discharge.   HEMATOLOGIC: No easy bruisability or excessive bleeding.   MUSCULOSKELETAL: Denies joint pain or swelling.   NEUROLOGIC: Denies syncope or weakness.   PSYCHIATRIC: Denies depression, anxiety or mood swings.    PHYSICAL EXAM:  APPEARANCE: Well nourished, well developed, in no acute distress.  AFFECT: WNL, alert and oriented x 3  SKIN: No acne or hirsutism  NECK: Neck symmetric without masses or thyromegaly  NODES: No inguinal, cervical, axillary, or femoral lymph node enlargement  CHEST: Good respiratory effect  ABDOMEN: Soft.  No tenderness or masses.  No hepatosplenomegaly.  No hernias.  BREASTS: Symmetrical, no skin changes or visible lesions.  No palpable masses, nipple discharge bilaterally.  EXTREMITIES: No edema.    Gynecologic exam normal          Patient was counseled today on A.C.S. Pap guidelines and recommendations for yearly pelvic exams, mammograms and monthly self breast exams; to see her PCP for other health maintenance.   Exercise regimen encouraged  Healthy food choices encouraged  Questions answered to desired level of satisfaction  Verbalized understanding to all information and instructions    Follow up in about 1 year (around 9/15/2023) for Annual Exam w/ PAP.

## 2022-10-19 ENCOUNTER — OFFICE VISIT (OUTPATIENT)
Dept: FAMILY MEDICINE | Facility: CLINIC | Age: 22
End: 2022-10-19
Payer: MEDICAID

## 2022-10-19 VITALS
HEART RATE: 88 BPM | HEIGHT: 67 IN | RESPIRATION RATE: 20 BRPM | SYSTOLIC BLOOD PRESSURE: 124 MMHG | BODY MASS INDEX: 44.89 KG/M2 | WEIGHT: 286 LBS | DIASTOLIC BLOOD PRESSURE: 84 MMHG

## 2022-10-19 DIAGNOSIS — L02.92 FURUNCLE: Primary | ICD-10-CM

## 2022-10-19 PROCEDURE — 3079F PR MOST RECENT DIASTOLIC BLOOD PRESSURE 80-89 MM HG: ICD-10-PCS | Mod: CPTII,,, | Performed by: FAMILY MEDICINE

## 2022-10-19 PROCEDURE — 3074F PR MOST RECENT SYSTOLIC BLOOD PRESSURE < 130 MM HG: ICD-10-PCS | Mod: CPTII,,, | Performed by: FAMILY MEDICINE

## 2022-10-19 PROCEDURE — 1160F PR REVIEW ALL MEDS BY PRESCRIBER/CLIN PHARMACIST DOCUMENTED: ICD-10-PCS | Mod: CPTII,,, | Performed by: FAMILY MEDICINE

## 2022-10-19 PROCEDURE — 99213 PR OFFICE/OUTPT VISIT, EST, LEVL III, 20-29 MIN: ICD-10-PCS | Mod: ,,, | Performed by: FAMILY MEDICINE

## 2022-10-19 PROCEDURE — 1160F RVW MEDS BY RX/DR IN RCRD: CPT | Mod: CPTII,,, | Performed by: FAMILY MEDICINE

## 2022-10-19 PROCEDURE — 3079F DIAST BP 80-89 MM HG: CPT | Mod: CPTII,,, | Performed by: FAMILY MEDICINE

## 2022-10-19 PROCEDURE — 1159F PR MEDICATION LIST DOCUMENTED IN MEDICAL RECORD: ICD-10-PCS | Mod: CPTII,,, | Performed by: FAMILY MEDICINE

## 2022-10-19 PROCEDURE — 3074F SYST BP LT 130 MM HG: CPT | Mod: CPTII,,, | Performed by: FAMILY MEDICINE

## 2022-10-19 PROCEDURE — 99213 OFFICE O/P EST LOW 20 MIN: CPT | Mod: ,,, | Performed by: FAMILY MEDICINE

## 2022-10-19 PROCEDURE — 1159F MED LIST DOCD IN RCRD: CPT | Mod: CPTII,,, | Performed by: FAMILY MEDICINE

## 2022-10-19 RX ORDER — SULFAMETHOXAZOLE AND TRIMETHOPRIM 800; 160 MG/1; MG/1
1 TABLET ORAL 2 TIMES DAILY
Qty: 20 TABLET | Refills: 0 | Status: SHIPPED | OUTPATIENT
Start: 2022-10-19 | End: 2022-12-01

## 2022-10-19 RX ORDER — CLINDAMYCIN HYDROCHLORIDE 300 MG/1
CAPSULE ORAL
Qty: 90 CAPSULE | Refills: 1 | Status: SHIPPED | OUTPATIENT
Start: 2022-10-19 | End: 2022-12-01

## 2022-10-19 NOTE — PROGRESS NOTES
Eric Lee MD        PATIENT NAME: Tulio Chance  : 2000  DATE: 10/19/22  MRN: [id number]      Billing Provider: Eric Lee MD  Level of Service: [place] OFFICE/OUTPT VISIT, EST, LEVL III, 20-29 MIN  Patient PCP Information       Provider PCP Type    YANN Milian [facility]            Reason for Visit / Chief Complaint: Recurrent Skin Infections (Has boils on inner thighs)       Update PCP  Update Chief Complaint         History of Present Illness / Problem Focused Workflow     Tulio Chance presents to the clinic with Recurrent Skin Infections (Has boils on inner thighs)     Recurrent small furuncles in inner thighs x months    Review of Systems     Review of Systems   Constitutional:  Negative for activity change, appetite change, fever and unexpected weight change.   HENT:  Negative for congestion, rhinorrhea, sinus pressure, sinus pain, sore throat and trouble swallowing.    Eyes:  Negative for photophobia, pain, discharge and visual disturbance.   Respiratory:  Negative for cough, chest tightness, wheezing and stridor.    Cardiovascular:  Negative for chest pain, palpitations and leg swelling.   Gastrointestinal:  Negative for abdominal pain, blood in stool, constipation, diarrhea and nausea.   Endocrine: Negative for polydipsia, polyphagia and polyuria.   Genitourinary:  Negative for difficulty urinating, flank pain and hematuria.   Musculoskeletal:  Negative for arthralgias and neck pain.   Skin:  Positive for rash.   Allergic/Immunologic: Negative for food allergies.   Neurological:  Negative for dizziness, tremors, seizures, syncope, weakness (global weakness) and headaches.   Psychiatric/Behavioral:  Negative for behavioral problems, confusion, decreased concentration, dysphoric mood and hallucinations. The patient is not nervous/anxious.       Medical / Social / Family History     Past Medical History:   Diagnosis Date    Breast lump     Breast mass        Past Surgical  History:   Procedure Laterality Date    APPENDECTOMY      BREAST BIOPSY Right 5/3/2021    Procedure: BIOPSY, BREAST, WITH LUMPECTOMY;  Surgeon: Eva Farmer MD;  Location: [facility] OR;  Service: General;  Laterality: Right;    TONSILLECTOMY Bilateral 11/19/2021    Procedure: TONSILLECTOMY;  Surgeon: Heber Tatum MD;  Location: [facility] OR;  Service: ENT;  Laterality: Bilateral;       Social History  Ms.  reports that she has never smoked. She has never used smokeless tobacco. She reports that she does not currently use alcohol. She reports that she does not use drugs.    Family History  Ms.'s family history includes Asthma in her brother; Diabetes in her maternal grandmother; Heart disease in her father and maternal grandfather; Hypertension in her father, maternal grandfather, maternal grandmother, and mother.    Medications and Allergies     Medications  No outpatient medications have been marked as taking for the 10/19/22 encounter (Office Visit) with Eric Lee MD.       Allergies  Review of patient's allergies indicates:  No Known Allergies    Physical Examination     Vitals:    10/19/22 1524   BP: 124/84   Pulse: 88   Resp: 20     Physical Exam  Constitutional:       General: She is not in acute distress.     Appearance: Normal appearance.   HENT:      Head: Normocephalic.      Right Ear: Tympanic membrane and ear canal normal.      Left Ear: Tympanic membrane and ear canal normal.      Nose: Nose normal.      Mouth/Throat:      Mouth: Mucous membranes are moist.      Pharynx: No oropharyngeal exudate.   Eyes:      Extraocular Movements: Extraocular movements intact.      Pupils: Pupils are equal, round, and reactive to light.   Cardiovascular:      Rate and Rhythm: Normal rate and regular rhythm.      Heart sounds: No murmur heard.  Pulmonary:      Effort: Pulmonary effort is normal.      Breath sounds: Normal breath sounds. No wheezing.   Abdominal:      General: Abdomen is flat. Bowel sounds  are normal.      Palpations: Abdomen is soft.      Hernia: No hernia is present.   Musculoskeletal:         General: Normal range of motion.      Cervical back: Normal range of motion and neck supple.      Right lower leg: No edema.      Left lower leg: No edema.   Lymphadenopathy:      Cervical: No cervical adenopathy.   Skin:     General: Skin is warm and dry.      Coloration: Skin is not jaundiced.      Findings: Rash present. No lesion.   Neurological:      General: No focal deficit present.      Mental Status: She is alert and oriented to person, place, and time.      Cranial Nerves: No cranial nerve deficit.      Gait: Gait normal.   Psychiatric:         Mood and Affect: Mood normal.         Behavior: Behavior normal.         Judgment: Judgment normal.        Assessment and Plan (including Health Maintenance)      Problem List  Smart Sets  Document Outside HM   :    Plan:   Furuncle  -     clindamycin (CLEOCIN) 300 MG capsule; 1 cap po daily  Dispense: 90 capsule; Refill: 1  -     sulfamethoxazole-trimethoprim 800-160mg (BACTRIM DS) 800-160 mg Tab; Take 1 tablet by mouth 2 (two) times daily.  Dispense: 20 tablet; Refill: 0         Health Maintenance Due   Topic Date Due    Hepatitis C Screening  Never done    Lipid Panel  Never done    COVID-19 Vaccine (1) Never done    HPV Vaccines (1 - 2-dose series) Never done    Chlamydia Screening  Never done    TETANUS VACCINE  Never done    Influenza Vaccine (1) Never done       Problem List Items Addressed This Visit    None  Visit Diagnoses       Furuncle    -  Primary    Relevant Medications    clindamycin (CLEOCIN) 300 MG capsule    sulfamethoxazole-trimethoprim 800-160mg (BACTRIM DS) 800-160 mg Tab            The patient has no Health Maintenance topics of status Not Due    Future Appointments   Date Time Provider Department Center   9/21/2023  2:00 PM Jenelle Morales MD [facility]    Patient's use Metamucil milk of magnesia and follow-up  p.r.n.    There are no Patient Instructions on file for this visit.  Follow up in about 4 weeks (around 11/16/2022) for routine followup.     Signature:  Eric Lee MD      Date of encounter: 10/19/22

## 2022-10-19 NOTE — LETTER
October 19, 2022      [facility]  [address] [place] [address]  Phone: [phone]  Fax: [phone]       Patient: Tulio Chance   YOB: 2000  Date of Visit: 10/19/2022    To Whom It May Concern:    Pablito Chance  was at [facility] on 10/19/2022. The patient may return to work 10/20/2023 with no restrictions. If you have any questions or concerns, or if I can be of further assistance, please do not hesitate to contact me.    Sincerely,    Lisa Jorge LPN

## 2022-11-09 ENCOUNTER — HOSPITAL ENCOUNTER (EMERGENCY)
Facility: HOSPITAL | Age: 22
Discharge: HOME | End: 2022-11-09
Payer: MEDICAID

## 2022-11-09 VITALS
HEART RATE: 77 BPM | DIASTOLIC BLOOD PRESSURE: 66 MMHG | HEIGHT: 66 IN | OXYGEN SATURATION: 100 % | RESPIRATION RATE: 18 BRPM | SYSTOLIC BLOOD PRESSURE: 134 MMHG | WEIGHT: 285 LBS | BODY MASS INDEX: 45.8 KG/M2 | TEMPERATURE: 98 F

## 2022-11-09 DIAGNOSIS — J06.9 UPPER RESPIRATORY TRACT INFECTION, UNSPECIFIED TYPE: Primary | ICD-10-CM

## 2022-11-09 LAB
FLUAV AG UPPER RESP QL IA.RAPID: NEGATIVE
FLUBV AG UPPER RESP QL IA.RAPID: NEGATIVE
SARS-COV+SARS-COV-2 AG RESP QL IA.RAPID: NEGATIVE

## 2022-11-09 PROCEDURE — 25000003 PHARM REV CODE 250: Performed by: NURSE PRACTITIONER

## 2022-11-09 PROCEDURE — 99283 EMERGENCY DEPT VISIT LOW MDM: CPT

## 2022-11-09 PROCEDURE — 87428 SARSCOV & INF VIR A&B AG IA: CPT | Performed by: NURSE PRACTITIONER

## 2022-11-09 PROCEDURE — 99282 EMERGENCY DEPT VISIT SF MDM: CPT | Mod: ,,, | Performed by: NURSE PRACTITIONER

## 2022-11-09 PROCEDURE — 99282 PR EMERGENCY DEPT VISIT,LEVEL II: ICD-10-PCS | Mod: ,,, | Performed by: NURSE PRACTITIONER

## 2022-11-09 RX ORDER — ACETAMINOPHEN 500 MG
1000 TABLET ORAL
Status: COMPLETED | OUTPATIENT
Start: 2022-11-09 | End: 2022-11-09

## 2022-11-09 RX ADMIN — ACETAMINOPHEN 1000 MG: 500 TABLET ORAL at 09:11

## 2022-11-09 NOTE — Clinical Note
"Tulio Hopedaniella" Robson was seen and treated in our emergency department on 11/9/2022.  She may return to work on 11/13/2022.       If you have any questions or concerns, please don't hesitate to call.      YANN Garnica"

## 2022-11-10 NOTE — ED NOTES
Instructed patient to drink lots & lots of fluids, not cafeinated beverages.  May take Mucinex-D that she can get over the counter at the pharmacy, will have to ask for this at the counter.   May tk\he 400-600 mg of ibuprofen when she gets home, then alternate tylenol 1000 mg every 4-6 hours with Ibuprofen 400-600 mg every 8 hours for pain/fever. Follow-up with pcp in 24-48 hours if symptoms not improving. Return to ed for any new or worsening symptoms.  Work excuse given. Patient verbalized understanding of all instructions.

## 2022-11-10 NOTE — ED NOTES
Discharge instructions discussed & given to patient. Patient verbalized understanding of all instructions.

## 2022-11-10 NOTE — ED NOTES
Patient presents to ed with c/o headache, coughing, nasal congestion for 2 days.  Respirations are even & unlabored. Patient has not coughed since she has been here in ED. Moves all extremities well. Rates pain at 8 of 10 at this time.

## 2022-11-10 NOTE — ED PROVIDER NOTES
Encounter Date: 11/9/2022       History     Chief Complaint   Patient presents with    Cough    Fever    Nasal Congestion     Been coughing for 2 days with nasal congestion, headache & sore throat.     Patient presents with headache, nasal congestion, and chills x2 days.  No change in intake or output.    Review of patient's allergies indicates:  No Known Allergies  Past Medical History:   Diagnosis Date    Appendicitis     Breast lump     Breast mass      Past Surgical History:   Procedure Laterality Date    APPENDECTOMY      BREAST BIOPSY Right 5/3/2021    Procedure: BIOPSY, BREAST, WITH LUMPECTOMY;  Surgeon: Eva Farmer MD;  Location: [facility];  Service: General;  Laterality: Right;    TONSILLECTOMY Bilateral 11/19/2021    Procedure: TONSILLECTOMY;  Surgeon: Heber Tatum MD;  Location: [facility];  Service: ENT;  Laterality: Bilateral;     Family History   Problem Relation Age of Onset    Hypertension Mother     Heart disease Father     Hypertension Father     Asthma Brother     Diabetes Maternal Grandmother     Hypertension Maternal Grandmother     Heart disease Maternal Grandfather     Hypertension Maternal Grandfather      Social History     Tobacco Use    Smoking status: Never    Smokeless tobacco: Never   Substance Use Topics    Alcohol use: Not Currently    Drug use: Never     Review of Systems   Constitutional:  Positive for chills and fever (Subjective).   HENT:  Positive for congestion and rhinorrhea. Negative for trouble swallowing.    Eyes:  Negative for visual disturbance.   Respiratory:  Positive for cough. Negative for shortness of breath.    Cardiovascular:  Negative for chest pain, palpitations and leg swelling.   Gastrointestinal:  Negative for abdominal pain, diarrhea, nausea and vomiting.   Genitourinary:  Negative for decreased urine volume, dysuria, frequency and hematuria.   Skin:  Negative for color change.   Neurological:  Positive for headaches. Negative for dizziness and  numbness.     Physical Exam     Initial Vitals [11/09/22 2053]   BP Pulse Resp Temp SpO2   123/83 (!) 120 20 98 °F (36.7 °C) 98 %      MAP       --         Physical Exam    Nursing note and vitals reviewed.  Constitutional: No distress.   HENT:   Head: Normocephalic and atraumatic.   Right Ear: Tympanic membrane normal.   Left Ear: Tympanic membrane normal.   Nose: Mucosal edema present.   Mouth/Throat: Oropharynx is clear and moist.   Eyes: EOM are normal. Pupils are equal, round, and reactive to light.   Neck: Neck supple.   Cardiovascular:  Normal rate, regular rhythm and normal heart sounds.           Pulmonary/Chest: Breath sounds normal. No respiratory distress.   Musculoskeletal:         General: Normal range of motion.      Cervical back: Neck supple.     Neurological: She is alert. GCS score is 15. GCS eye subscore is 4. GCS verbal subscore is 5. GCS motor subscore is 6.   Skin: Skin is warm and dry. Capillary refill takes less than 2 seconds.       Medical Screening Exam   See Full Note    ED Course   Procedures  Labs Reviewed   SARS-COV2 (COVID) W/ FLU ANTIGEN - Normal    Narrative:     Negative SARS-CoV results should not be used as the sole basis for treatment or patient management decisions; negative results should be considered in the context of a patient's recent exposures, history and the presene of clinical signs and symptoms consistent with COVID-19.  Negative results should be treated as presumptive and confirmed by molecular assay, if necessary for patient management.          Imaging Results    None          Medications   acetaminophen tablet 1,000 mg (1,000 mg Oral Given 11/9/22 2134)                       Clinical Impression:   Final diagnoses:  [J06.9] Upper respiratory tract infection, unspecified type (Primary)      ED Disposition Condition    Discharge Stable          ED Prescriptions    None       Follow-up Information    None          Dani López, YANN  11/09/22 2152

## 2022-11-28 ENCOUNTER — HOSPITAL ENCOUNTER (EMERGENCY)
Facility: HOSPITAL | Age: 22
Discharge: HOME | End: 2022-11-28
Payer: MEDICAID

## 2022-11-28 VITALS
WEIGHT: 285 LBS | HEART RATE: 90 BPM | RESPIRATION RATE: 18 BRPM | HEIGHT: 67 IN | DIASTOLIC BLOOD PRESSURE: 83 MMHG | SYSTOLIC BLOOD PRESSURE: 128 MMHG | BODY MASS INDEX: 44.73 KG/M2 | OXYGEN SATURATION: 100 % | TEMPERATURE: 98 F

## 2022-11-28 DIAGNOSIS — L03.011 PARONYCHIA OF RIGHT RING FINGER: Primary | ICD-10-CM

## 2022-11-28 PROCEDURE — 99283 PR EMERGENCY DEPT VISIT,LEVEL III: ICD-10-PCS | Mod: 25,,, | Performed by: PHYSICIAN ASSISTANT

## 2022-11-28 PROCEDURE — 10060 I&D ABSCESS SIMPLE/SINGLE: CPT | Mod: ,,, | Performed by: PHYSICIAN ASSISTANT

## 2022-11-28 PROCEDURE — 63600175 PHARM REV CODE 636 W HCPCS: Performed by: PHYSICIAN ASSISTANT

## 2022-11-28 PROCEDURE — 99284 EMERGENCY DEPT VISIT MOD MDM: CPT | Mod: 25

## 2022-11-28 PROCEDURE — 99283 EMERGENCY DEPT VISIT LOW MDM: CPT | Mod: 25,,, | Performed by: PHYSICIAN ASSISTANT

## 2022-11-28 PROCEDURE — 10060 I&D ABSCESS SIMPLE/SINGLE: CPT

## 2022-11-28 PROCEDURE — 10060 PR DRAIN SKIN ABSCESS SIMPLE: ICD-10-PCS | Mod: ,,, | Performed by: PHYSICIAN ASSISTANT

## 2022-11-28 PROCEDURE — 96372 THER/PROPH/DIAG INJ SC/IM: CPT | Mod: 59 | Performed by: PHYSICIAN ASSISTANT

## 2022-11-28 RX ORDER — KETOROLAC TROMETHAMINE 30 MG/ML
30 INJECTION, SOLUTION INTRAMUSCULAR; INTRAVENOUS
Status: COMPLETED | OUTPATIENT
Start: 2022-11-28 | End: 2022-11-28

## 2022-11-28 RX ADMIN — KETOROLAC TROMETHAMINE 30 MG: 30 INJECTION, SOLUTION INTRAMUSCULAR at 12:11

## 2022-11-28 NOTE — ED TRIAGE NOTES
Pt presents to ED with c/o finger pain to fourth digit on right hand. States pain started 4 days ago. Describes pain as a throbbing, but states its severe if she hits her hand on something.

## 2022-11-28 NOTE — ED PROVIDER NOTES
Encounter Date: 11/28/2022       History     Chief Complaint   Patient presents with    Hand Pain     Fourth digit on right hand     Patient is a 21-year-old female with history of right ring finger pain secondary to no known injury.    It is red swollen and painful on the ulnar aspect of the nail    Review of patient's allergies indicates:  No Known Allergies  Past Medical History:   Diagnosis Date    Appendicitis     Breast lump     Breast mass      Past Surgical History:   Procedure Laterality Date    APPENDECTOMY      BREAST BIOPSY Right 5/3/2021    Procedure: BIOPSY, BREAST, WITH LUMPECTOMY;  Surgeon: Eva Farmer MD;  Location: [facility];  Service: General;  Laterality: Right;    TONSILLECTOMY Bilateral 11/19/2021    Procedure: TONSILLECTOMY;  Surgeon: Heber Tatum MD;  Location: [facility];  Service: ENT;  Laterality: Bilateral;     Family History   Problem Relation Age of Onset    Hypertension Mother     Heart disease Father     Hypertension Father     Asthma Brother     Diabetes Maternal Grandmother     Hypertension Maternal Grandmother     Heart disease Maternal Grandfather     Hypertension Maternal Grandfather      Social History     Tobacco Use    Smoking status: Every Day     Types: Vaping with nicotine    Smokeless tobacco: Never   Substance Use Topics    Alcohol use: Not Currently    Drug use: Never     Review of Systems   Musculoskeletal:         Right ring finger pain   All other systems reviewed and are negative.    Physical Exam     Initial Vitals [11/28/22 0014]   BP Pulse Resp Temp SpO2   128/83 90 18 98.3 °F (36.8 °C) 100 %      MAP       --         Physical Exam    Nursing note and vitals reviewed.  Constitutional: She appears well-nourished.   HENT:   Head: Atraumatic.   Eyes: EOM are normal.   Cardiovascular:  Normal rate and regular rhythm.           Pulmonary/Chest: No respiratory distress.   Musculoskeletal:      Comments: Right index finger:  Tenderness to palpation to the  ulnar aspect of the nail with erythema.         Neurological: She is alert.   Skin: Skin is dry.   Psychiatric: She has a normal mood and affect.       Medical Screening Exam   See Full Note    ED Course   I & D - Incision and Drainage    Date/Time: 11/28/2022 12:38 AM  Location procedure was performed: [facility] EMERGENCY DEPARTMENT  Performed by: JESSICA Cordreo  Authorized by: JESSICA Cordero   Pre-operative diagnosis: Paronychia  Post-operative diagnosis: Paronychia  Consent Done: Emergent Situation  Indications for incision and drainage: Paronychia.  Location: Right ring finger.  Anesthesia method: None.    Patient sedated: no  Description of findings: Patient soaked in iodine and peroxide for 10 minutes, then 18 gauge needle was used for incision and drainage of paronychia   Wound treatment: incision and drainage  Complications: No  Patient tolerance: Patient tolerated the procedure well with no immediate complications      Labs Reviewed - No data to display       Imaging Results    None          Medications   ketorolac injection 30 mg (30 mg Intramuscular Given 11/28/22 0040)                 ED Course as of 11/28/22 0042 Mon Nov 28, 2022 0040 Paronychia I and D was completed [CB]      ED Course User Index  [CB] JESSICA Cordero          Clinical Impression:   Final diagnoses:  [L03.011] Paronychia of right ring finger (Primary)      ED Disposition Condition    Discharge Stable          ED Prescriptions    None       Follow-up Information    None          JESSICA Cordero  11/28/22 0042

## 2022-12-01 ENCOUNTER — OFFICE VISIT (OUTPATIENT)
Dept: FAMILY MEDICINE | Facility: CLINIC | Age: 22
End: 2022-12-01
Payer: MEDICAID

## 2022-12-01 VITALS
SYSTOLIC BLOOD PRESSURE: 126 MMHG | HEIGHT: 66 IN | DIASTOLIC BLOOD PRESSURE: 76 MMHG | HEART RATE: 95 BPM | WEIGHT: 291.19 LBS | BODY MASS INDEX: 46.8 KG/M2 | TEMPERATURE: 99 F | OXYGEN SATURATION: 98 % | RESPIRATION RATE: 20 BRPM

## 2022-12-01 DIAGNOSIS — J02.9 SORE THROAT: ICD-10-CM

## 2022-12-01 DIAGNOSIS — R11.10 VOMITING, UNSPECIFIED VOMITING TYPE, UNSPECIFIED WHETHER NAUSEA PRESENT: Primary | ICD-10-CM

## 2022-12-01 DIAGNOSIS — Z20.822 EXPOSURE TO COVID-19 VIRUS: ICD-10-CM

## 2022-12-01 LAB
CTP QC/QA: YES
FLUAV AG NPH QL: NEGATIVE
FLUBV AG NPH QL: NEGATIVE
S PYO RRNA THROAT QL PROBE: NEGATIVE
SARS-COV-2 AG RESP QL IA.RAPID: NEGATIVE

## 2022-12-01 PROCEDURE — 87880 STREP A ASSAY W/OPTIC: CPT | Mod: RHCUB | Performed by: NURSE PRACTITIONER

## 2022-12-01 PROCEDURE — 1160F RVW MEDS BY RX/DR IN RCRD: CPT | Mod: CPTII,,, | Performed by: NURSE PRACTITIONER

## 2022-12-01 PROCEDURE — 1159F PR MEDICATION LIST DOCUMENTED IN MEDICAL RECORD: ICD-10-PCS | Mod: CPTII,,, | Performed by: NURSE PRACTITIONER

## 2022-12-01 PROCEDURE — 3008F PR BODY MASS INDEX (BMI) DOCUMENTED: ICD-10-PCS | Mod: CPTII,,, | Performed by: NURSE PRACTITIONER

## 2022-12-01 PROCEDURE — 99213 OFFICE O/P EST LOW 20 MIN: CPT | Mod: ,,, | Performed by: NURSE PRACTITIONER

## 2022-12-01 PROCEDURE — 3074F SYST BP LT 130 MM HG: CPT | Mod: CPTII,,, | Performed by: NURSE PRACTITIONER

## 2022-12-01 PROCEDURE — 3078F DIAST BP <80 MM HG: CPT | Mod: CPTII,,, | Performed by: NURSE PRACTITIONER

## 2022-12-01 PROCEDURE — 1159F MED LIST DOCD IN RCRD: CPT | Mod: CPTII,,, | Performed by: NURSE PRACTITIONER

## 2022-12-01 PROCEDURE — 3008F BODY MASS INDEX DOCD: CPT | Mod: CPTII,,, | Performed by: NURSE PRACTITIONER

## 2022-12-01 PROCEDURE — 87804 INFLUENZA ASSAY W/OPTIC: CPT | Mod: 59,RHCUB | Performed by: NURSE PRACTITIONER

## 2022-12-01 PROCEDURE — 3074F PR MOST RECENT SYSTOLIC BLOOD PRESSURE < 130 MM HG: ICD-10-PCS | Mod: CPTII,,, | Performed by: NURSE PRACTITIONER

## 2022-12-01 PROCEDURE — 87426 SARSCOV CORONAVIRUS AG IA: CPT | Mod: RHCUB | Performed by: NURSE PRACTITIONER

## 2022-12-01 PROCEDURE — 3078F PR MOST RECENT DIASTOLIC BLOOD PRESSURE < 80 MM HG: ICD-10-PCS | Mod: CPTII,,, | Performed by: NURSE PRACTITIONER

## 2022-12-01 PROCEDURE — 99213 PR OFFICE/OUTPT VISIT, EST, LEVL III, 20-29 MIN: ICD-10-PCS | Mod: ,,, | Performed by: NURSE PRACTITIONER

## 2022-12-01 PROCEDURE — 1160F PR REVIEW ALL MEDS BY PRESCRIBER/CLIN PHARMACIST DOCUMENTED: ICD-10-PCS | Mod: CPTII,,, | Performed by: NURSE PRACTITIONER

## 2022-12-01 NOTE — LETTER
December 1, 2022      [facility]  [address]  [place] [address]  Phone: [phone]  Fax: [phone]       Patient: Tulio Chance   YOB: 2000  Date of Visit: 12/01/2022    To Whom It May Concern:    Pablito Chance  was at [facility] on 12/01/2022. The patient may return to work Friday, 12/02/2022 with no restrictions. If you have any questions or concerns, or if I can be of further assistance, please do not hesitate to contact me.    Sincerely,    YANN Bingham

## 2022-12-02 NOTE — PROGRESS NOTES
"New clinic note    Tulio Chance is a 21 y.o. female     Chief Complaint:   Chief Complaint   Patient presents with    Sore Throat    Headache     vomiting        Subjective:    Patient complains of feeling weak and vomited X 1. Patient reports she intermittent fast from 8pm-11am typically. Patient reports today she has not eaten anything and felt weak and a little dizzy. Patient states she tried to eat 4 fries but vomited once. Denies abdominal pain. Denies diarrhea.     Sore Throat   Associated symptoms include vomiting. Pertinent negatives include no abdominal pain, congestion, coughing, diarrhea, headaches or shortness of breath.   Headache   Associated symptoms include a sore throat, vomiting and weakness. Pertinent negatives include no abdominal pain, coughing, fever, nausea or rhinorrhea.      Allergies:   Review of patient's allergies indicates:  No Known Allergies     Past Medical History:  Past Medical History:   Diagnosis Date    Appendicitis     Breast lump     Breast mass         Current Medications:    Current Outpatient Medications:     albuterol (PROVENTIL/VENTOLIN HFA) 90 mcg/actuation inhaler, Inhale 1-2 puffs into the lungs every 6 (six) hours as needed for Wheezing. Rescue, Disp: 18 g, Rfl: 2    thymol/chlorophyllin (CHLOROPHYLL ORAL), Take by mouth., Disp: , Rfl:        Review of Systems   Constitutional:  Negative for fever.   HENT:  Positive for sore throat. Negative for nasal congestion, postnasal drip and rhinorrhea.    Respiratory:  Negative for cough and shortness of breath.    Gastrointestinal:  Positive for vomiting. Negative for abdominal pain, constipation, diarrhea and nausea.   Neurological:  Positive for weakness. Negative for headaches.        Objective:    /76 (BP Location: Right arm, Patient Position: Sitting, BP Method: Large (Manual))   Pulse 95   Temp 98.7 °F (37.1 °C) (Oral)   Resp 20   Ht 5' 6" (1.676 m)   Wt 132.1 kg (291 lb 3.2 oz)   LMP 11/02/2022 (Exact " Date)   SpO2 98%   BMI 47.00 kg/m²      Physical Exam  Constitutional:       Appearance: Normal appearance. She is obese.   HENT:      Mouth/Throat:      Pharynx: No oropharyngeal exudate or posterior oropharyngeal erythema.   Eyes:      Extraocular Movements: Extraocular movements intact.   Cardiovascular:      Rate and Rhythm: Normal rate and regular rhythm.      Pulses: Normal pulses.      Heart sounds: Normal heart sounds.   Pulmonary:      Effort: Pulmonary effort is normal.      Breath sounds: Normal breath sounds.   Abdominal:      Palpations: Abdomen is soft.      Tenderness: There is no abdominal tenderness.   Neurological:      Mental Status: She is alert and oriented to person, place, and time.        Assessment and Plan:    1. Vomiting, unspecified vomiting type, unspecified whether nausea present    2. Sore throat    3. Exposure to COVID-19 virus         Vomiting, unspecified vomiting type, unspecified whether nausea present  -bland diet and advance as tolerated  -small frequent sips of fluid    Sore throat  -     POCT rapid strep A    Exposure to COVID-19 virus  -     SARS Coronavirus 2 Antigen, POCT  -     POCT Influenza A/B       Covid -  Flu -  Strep -    Discussed diet and fasting. Patient has not had a meal since yesterday which could cause weakness,etc. Patient agreed she felt it was her lack of eating causing her symptoms today.     There are no Patient Instructions on file for this visit.   Follow up if symptoms worsen or fail to improve.

## 2022-12-13 ENCOUNTER — TELEPHONE (OUTPATIENT)
Dept: EMERGENCY MEDICINE | Facility: HOSPITAL | Age: 22
End: 2022-12-13
Payer: MEDICAID

## 2022-12-18 ENCOUNTER — HOSPITAL ENCOUNTER (EMERGENCY)
Facility: HOSPITAL | Age: 22
Discharge: HOME | End: 2022-12-18
Payer: MEDICAID

## 2022-12-18 VITALS
BODY MASS INDEX: 47.48 KG/M2 | DIASTOLIC BLOOD PRESSURE: 83 MMHG | OXYGEN SATURATION: 99 % | TEMPERATURE: 98 F | HEIGHT: 65 IN | HEART RATE: 76 BPM | RESPIRATION RATE: 18 BRPM | SYSTOLIC BLOOD PRESSURE: 124 MMHG | WEIGHT: 285 LBS

## 2022-12-18 DIAGNOSIS — J06.9 UPPER RESPIRATORY TRACT INFECTION, UNSPECIFIED TYPE: Primary | ICD-10-CM

## 2022-12-18 PROCEDURE — 96372 THER/PROPH/DIAG INJ SC/IM: CPT | Performed by: FAMILY MEDICINE

## 2022-12-18 PROCEDURE — 99283 EMERGENCY DEPT VISIT LOW MDM: CPT | Mod: ,,, | Performed by: FAMILY MEDICINE

## 2022-12-18 PROCEDURE — 25000003 PHARM REV CODE 250: Performed by: FAMILY MEDICINE

## 2022-12-18 PROCEDURE — 99283 PR EMERGENCY DEPT VISIT,LEVEL III: ICD-10-PCS | Mod: ,,, | Performed by: FAMILY MEDICINE

## 2022-12-18 PROCEDURE — 99284 EMERGENCY DEPT VISIT MOD MDM: CPT

## 2022-12-18 PROCEDURE — 63600175 PHARM REV CODE 636 W HCPCS: Performed by: FAMILY MEDICINE

## 2022-12-18 RX ORDER — CEPHALEXIN 500 MG/1
500 CAPSULE ORAL EVERY 8 HOURS
Qty: 30 CAPSULE | Refills: 0 | Status: SHIPPED | OUTPATIENT
Start: 2022-12-18 | End: 2022-12-22

## 2022-12-18 RX ORDER — CEFTRIAXONE 1 G/1
INJECTION, POWDER, FOR SOLUTION INTRAMUSCULAR; INTRAVENOUS
Status: DISCONTINUED
Start: 2022-12-18 | End: 2022-12-19 | Stop reason: HOSPADM

## 2022-12-18 RX ORDER — METHYLPREDNISOLONE ACETATE 40 MG/ML
40 INJECTION, SUSPENSION INTRA-ARTICULAR; INTRALESIONAL; INTRAMUSCULAR; SOFT TISSUE
Status: COMPLETED | OUTPATIENT
Start: 2022-12-18 | End: 2022-12-18

## 2022-12-18 RX ORDER — DEXAMETHASONE SODIUM PHOSPHATE 4 MG/ML
4 INJECTION, SOLUTION INTRA-ARTICULAR; INTRALESIONAL; INTRAMUSCULAR; INTRAVENOUS; SOFT TISSUE
Status: COMPLETED | OUTPATIENT
Start: 2022-12-18 | End: 2022-12-18

## 2022-12-18 RX ADMIN — METHYLPREDNISOLONE ACETATE 40 MG: 40 INJECTION, SUSPENSION INTRA-ARTICULAR; INTRALESIONAL; INTRAMUSCULAR; INTRASYNOVIAL; SOFT TISSUE at 04:12

## 2022-12-18 RX ADMIN — CEFTRIAXONE SODIUM 1 G: 1 INJECTION, POWDER, FOR SOLUTION INTRAMUSCULAR; INTRAVENOUS at 04:12

## 2022-12-18 RX ADMIN — DEXAMETHASONE SODIUM PHOSPHATE 4 MG: 4 INJECTION, SOLUTION INTRA-ARTICULAR; INTRALESIONAL; INTRAMUSCULAR; INTRAVENOUS; SOFT TISSUE at 04:12

## 2022-12-18 NOTE — ED PROVIDER NOTES
Encounter Date: 12/18/2022       History     Chief Complaint   Patient presents with    Cough    Sore Throat     Pt in  with  cough  and cold   type symtoms  with  sore throat  and fatigue       Review of patient's allergies indicates:  No Known Allergies  Past Medical History:   Diagnosis Date    Appendicitis     Breast lump     Breast mass      Past Surgical History:   Procedure Laterality Date    APPENDECTOMY      BREAST BIOPSY Right 5/3/2021    Procedure: BIOPSY, BREAST, WITH LUMPECTOMY;  Surgeon: Eva Farmer MD;  Location: [facility];  Service: General;  Laterality: Right;    TONSILLECTOMY Bilateral 11/19/2021    Procedure: TONSILLECTOMY;  Surgeon: Heber Tatum MD;  Location: [facility];  Service: ENT;  Laterality: Bilateral;     Family History   Problem Relation Age of Onset    Hypertension Mother     Heart disease Father     Hypertension Father     Asthma Brother     Diabetes Maternal Grandmother     Hypertension Maternal Grandmother     Heart disease Maternal Grandfather     Hypertension Maternal Grandfather      Social History     Tobacco Use    Smoking status: Every Day     Types: Vaping with nicotine    Smokeless tobacco: Never   Substance Use Topics    Alcohol use: Not Currently    Drug use: Never     Review of Systems   Constitutional: Negative.  Negative for fever.   HENT:  Positive for sneezing and sore throat.    Eyes: Negative.    Respiratory:  Positive for cough. Negative for shortness of breath.    Cardiovascular: Negative.  Negative for chest pain.   Gastrointestinal: Negative.  Negative for nausea.   Endocrine: Negative.    Genitourinary: Negative.  Negative for dysuria.   Musculoskeletal: Negative.  Negative for back pain.   Skin: Negative.  Negative for rash.   Allergic/Immunologic: Negative.    Neurological: Negative.  Negative for weakness.   Hematological: Negative.  Does not bruise/bleed easily.   Psychiatric/Behavioral: Negative.     All other systems reviewed and are  negative.    Physical Exam     Initial Vitals [12/18/22 1546]   BP Pulse Resp Temp SpO2   124/83 76 18 98 °F (36.7 °C) 99 %      MAP       --         Physical Exam    Constitutional: She appears well-developed and well-nourished.   HENT:   Head: Normocephalic and atraumatic.   Right Ear: External ear normal.   Left Ear: External ear normal.   Nose: Nose normal.   Mouth/Throat: Oropharynx is clear and moist.   Eryth pharn   Eyes: Conjunctivae and EOM are normal. Pupils are equal, round, and reactive to light.   Neck: Neck supple.   Normal range of motion.  Cardiovascular:  Normal rate, regular rhythm, normal heart sounds and intact distal pulses.           Pulmonary/Chest: Breath sounds normal.   Abdominal: Abdomen is soft. Bowel sounds are normal.   Genitourinary:    Vagina and uterus normal.     Musculoskeletal:         General: Normal range of motion.      Cervical back: Normal range of motion and neck supple.     Neurological: She is alert and oriented to person, place, and time. She has normal strength and normal reflexes.   Skin: Skin is warm. Capillary refill takes less than 2 seconds.   Psychiatric: She has a normal mood and affect. Her behavior is normal. Judgment and thought content normal.       Medical Screening Exam   See Full Note    ED Course   Procedures  Labs Reviewed - No data to display       Imaging Results    None          Medications   dexAMETHasone injection 4 mg (has no administration in time range)   methylPREDNISolone acetate injection 40 mg (has no administration in time range)   cefTRIAXone (ROCEPHIN) 1 g in LIDOcaine HCL 10 mg/ml (1%) 4 mL IM only syringe (has no administration in time range)                       Clinical Impression:   Final diagnoses:  [J06.9] Upper respiratory tract infection, unspecified type (Primary)        ED Disposition Condition    Discharge Stable          ED Prescriptions       Medication Sig Dispense Start Date End Date Auth. Provider    cephALEXin (KEFLEX)  500 MG capsule Take 1 capsule (500 mg total) by mouth every 8 (eight) hours. for 10 days 30 capsule 12/18/2022 12/28/2022 Guilherme Finn, DO          Follow-up Information    None          Guilherme Finn,   12/18/22 1981

## 2022-12-18 NOTE — Clinical Note
"Tulio Hopedaniella" Robson was seen and treated in our emergency department on 12/18/2022.  She may return to work on 12/22/2022.       If you have any questions or concerns, please don't hesitate to call.      Guilherme Finn, DO"

## 2022-12-20 NOTE — PROGRESS NOTES
Subjective:       Patient ID: Tulio Chance is a 22 y.o. female.    Chief Complaint: Annual Exam    Medicaid wellness/ School physical exam  Review of Systems   Constitutional:  Negative for appetite change, fatigue and unexpected weight change.   Eyes:  Negative for visual disturbance.   Respiratory:  Negative for cough, chest tightness and shortness of breath.    Cardiovascular:  Negative for chest pain, palpitations and leg swelling.   Gastrointestinal:  Negative for abdominal distention, abdominal pain, change in bowel habit and change in bowel habit.   Genitourinary:  Negative for dysuria.   Musculoskeletal:  Negative for back pain and gait problem.   Integumentary:  Negative for rash and mole/lesion.   Neurological:  Negative for dizziness and headaches.   Hematological:  Negative for adenopathy.   Psychiatric/Behavioral:  Negative for sleep disturbance.        Objective:      Physical Exam  Vitals reviewed.   HENT:      Head: Normocephalic.      Right Ear: There is impacted cerumen.      Left Ear: Tympanic membrane, ear canal and external ear normal.      Nose: Congestion and rhinorrhea present.      Mouth/Throat:      Mouth: Mucous membranes are moist.   Eyes:      Pupils: Pupils are equal, round, and reactive to light.   Cardiovascular:      Rate and Rhythm: Normal rate and regular rhythm.      Pulses: Normal pulses.      Heart sounds: Normal heart sounds.   Pulmonary:      Effort: Pulmonary effort is normal.      Breath sounds: Normal breath sounds.   Abdominal:      Palpations: Abdomen is soft.   Musculoskeletal:         General: Normal range of motion.      Cervical back: Normal range of motion and neck supple.   Lymphadenopathy:      Cervical: No cervical adenopathy.   Skin:     General: Skin is warm and dry.      Capillary Refill: Capillary refill takes less than 2 seconds.   Neurological:      Mental Status: She is alert and oriented to person, place, and time.   Psychiatric:         Mood and  Affect: Mood normal.         Behavior: Behavior normal.       Assessment:       Problem List Items Addressed This Visit          Pulmonary    Asthma    Relevant Medications    albuterol (PROVENTIL/VENTOLIN HFA) 90 mcg/actuation inhaler       Cardiac/Vascular    Encounter for screening for lipid disorder    Relevant Orders    Lipid Panel       ID    Encounter for screening examination for sexually transmitted disease    Encounter for hepatitis C screening test for low risk patient    Relevant Orders    Hepatitis C Antibody    Immunization due    Relevant Orders    Influenza - Quadrivalent *Preferred* (6 months+) (PF) (Completed)       Endocrine    Encounter for screening for diabetes mellitus    Relevant Orders    Comprehensive Metabolic Panel    Vitamin D deficiency    Relevant Orders    Vitamin D       Other    Encounter for long-term (current) use of other medications    Relevant Orders    CBC Auto Differential    Comprehensive Metabolic Panel    Urinalysis    TSH    Routine general medical examination at a health care facility - Primary         Plan:   Normal PE; cleared for school      Goals:   Tc<200  FBG<100  /80 or better    Plan:  Yearly Wellness  Diet low in carbs and sugars. Increase protein, fiber, and green leafy vegetables  Exercise 3-4x wkly for at least 30min

## 2022-12-22 ENCOUNTER — OFFICE VISIT (OUTPATIENT)
Dept: FAMILY MEDICINE | Facility: CLINIC | Age: 22
End: 2022-12-22
Payer: MEDICAID

## 2022-12-22 VITALS
HEIGHT: 65 IN | OXYGEN SATURATION: 98 % | TEMPERATURE: 99 F | WEIGHT: 293 LBS | HEART RATE: 88 BPM | BODY MASS INDEX: 48.82 KG/M2 | DIASTOLIC BLOOD PRESSURE: 74 MMHG | RESPIRATION RATE: 16 BRPM | SYSTOLIC BLOOD PRESSURE: 118 MMHG

## 2022-12-22 DIAGNOSIS — J45.909 ASTHMA, UNSPECIFIED ASTHMA SEVERITY, UNSPECIFIED WHETHER COMPLICATED, UNSPECIFIED WHETHER PERSISTENT: Chronic | ICD-10-CM

## 2022-12-22 DIAGNOSIS — Z11.3 ENCOUNTER FOR SCREENING EXAMINATION FOR SEXUALLY TRANSMITTED DISEASE: ICD-10-CM

## 2022-12-22 DIAGNOSIS — Z00.00 ROUTINE GENERAL MEDICAL EXAMINATION AT A HEALTH CARE FACILITY: Primary | ICD-10-CM

## 2022-12-22 DIAGNOSIS — Z13.1 ENCOUNTER FOR SCREENING FOR DIABETES MELLITUS: ICD-10-CM

## 2022-12-22 DIAGNOSIS — E55.9 VITAMIN D DEFICIENCY: ICD-10-CM

## 2022-12-22 DIAGNOSIS — Z11.59 ENCOUNTER FOR HEPATITIS C SCREENING TEST FOR LOW RISK PATIENT: ICD-10-CM

## 2022-12-22 DIAGNOSIS — Z79.899 ENCOUNTER FOR LONG-TERM (CURRENT) USE OF OTHER MEDICATIONS: ICD-10-CM

## 2022-12-22 DIAGNOSIS — Z13.220 ENCOUNTER FOR SCREENING FOR LIPID DISORDER: ICD-10-CM

## 2022-12-22 DIAGNOSIS — Z23 IMMUNIZATION DUE: ICD-10-CM

## 2022-12-22 LAB
25(OH)D3 SERPL-MCNC: 12.5 NG/ML
ALBUMIN SERPL BCP-MCNC: 3.5 G/DL (ref 3.5–5)
ALBUMIN/GLOB SERPL: 0.8 {RATIO}
ALP SERPL-CCNC: 72 U/L (ref 52–144)
ALT SERPL W P-5'-P-CCNC: 25 U/L (ref 13–56)
ANION GAP SERPL CALCULATED.3IONS-SCNC: 8 MMOL/L (ref 7–16)
AST SERPL W P-5'-P-CCNC: 12 U/L (ref 15–37)
BACTERIA #/AREA URNS HPF: ABNORMAL /HPF
BASOPHILS # BLD AUTO: 0.04 K/UL (ref 0–0.2)
BASOPHILS NFR BLD AUTO: 0.5 % (ref 0–1)
BILIRUB SERPL-MCNC: 0.2 MG/DL (ref ?–1.2)
BILIRUB UR QL STRIP: NEGATIVE
BUN SERPL-MCNC: 8 MG/DL (ref 7–18)
BUN/CREAT SERPL: 9 (ref 6–20)
CALCIUM SERPL-MCNC: 8.6 MG/DL (ref 8.5–10.1)
CHLORIDE SERPL-SCNC: 106 MMOL/L (ref 98–107)
CHOLEST SERPL-MCNC: 140 MG/DL (ref 0–200)
CHOLEST/HDLC SERPL: 3 {RATIO}
CLARITY UR: CLEAR
CO2 SERPL-SCNC: 27 MMOL/L (ref 21–32)
COLOR UR: ABNORMAL
CREAT SERPL-MCNC: 0.86 MG/DL (ref 0.55–1.02)
DIFFERENTIAL METHOD BLD: ABNORMAL
EGFR (NO RACE VARIABLE) (RUSH/TITUS): 99 ML/MIN/1.73M²
EOSINOPHIL # BLD AUTO: 0.08 K/UL (ref 0–0.5)
EOSINOPHIL NFR BLD AUTO: 1 % (ref 1–4)
ERYTHROCYTE [DISTWIDTH] IN BLOOD BY AUTOMATED COUNT: 15.2 % (ref 11.5–14.5)
GLOBULIN SER-MCNC: 4.3 G/DL (ref 2–4)
GLUCOSE SERPL-MCNC: 89 MG/DL (ref 74–106)
GLUCOSE UR STRIP-MCNC: NORMAL MG/DL
HCT VFR BLD AUTO: 38.1 % (ref 38–47)
HCV AB SER QL: NORMAL
HDLC SERPL-MCNC: 46 MG/DL (ref 40–60)
HGB BLD-MCNC: 12.2 G/DL (ref 12–16)
IMM GRANULOCYTES # BLD AUTO: 0.03 K/UL (ref 0–0.04)
IMM GRANULOCYTES NFR BLD: 0.4 % (ref 0–0.4)
KETONES UR STRIP-SCNC: NEGATIVE MG/DL
LDLC SERPL CALC-MCNC: 71 MG/DL
LDLC/HDLC SERPL: 1.5 {RATIO}
LEUKOCYTE ESTERASE UR QL STRIP: ABNORMAL
LYMPHOCYTES # BLD AUTO: 2.84 K/UL (ref 1–4.8)
LYMPHOCYTES NFR BLD AUTO: 36.4 % (ref 27–41)
MCH RBC QN AUTO: 28.1 PG (ref 27–31)
MCHC RBC AUTO-ENTMCNC: 32 G/DL (ref 32–36)
MCV RBC AUTO: 87.8 FL (ref 80–96)
MONOCYTES # BLD AUTO: 0.61 K/UL (ref 0–0.8)
MONOCYTES NFR BLD AUTO: 7.8 % (ref 2–6)
MPC BLD CALC-MCNC: 11.1 FL (ref 9.4–12.4)
MUCOUS, UA: ABNORMAL /LPF
NEUTROPHILS # BLD AUTO: 4.21 K/UL (ref 1.8–7.7)
NEUTROPHILS NFR BLD AUTO: 53.9 % (ref 53–65)
NITRITE UR QL STRIP: NEGATIVE
NONHDLC SERPL-MCNC: 94 MG/DL
NRBC # BLD AUTO: 0 X10E3/UL
NRBC, AUTO (.00): 0 %
PH UR STRIP: 6.5 PH UNITS
PLATELET # BLD AUTO: 413 K/UL (ref 150–400)
POTASSIUM SERPL-SCNC: 3.9 MMOL/L (ref 3.5–5.1)
PROT SERPL-MCNC: 7.8 G/DL (ref 6.4–8.2)
PROT UR QL STRIP: 10
RBC # BLD AUTO: 4.34 M/UL (ref 4.2–5.4)
RBC # UR STRIP: NEGATIVE /UL
RBC #/AREA URNS HPF: 3 /HPF
SODIUM SERPL-SCNC: 137 MMOL/L (ref 136–145)
SP GR UR STRIP: 1.03
SQUAMOUS #/AREA URNS LPF: ABNORMAL /HPF
TRIGL SERPL-MCNC: 115 MG/DL (ref 35–150)
TSH SERPL DL<=0.005 MIU/L-ACNC: 4.09 UIU/ML (ref 0.36–3.74)
UROBILINOGEN UR STRIP-ACNC: NORMAL MG/DL
VLDLC SERPL-MCNC: 23 MG/DL
WBC # BLD AUTO: 7.81 K/UL (ref 4.5–11)
WBC #/AREA URNS HPF: 7 /HPF

## 2022-12-22 PROCEDURE — 80061 LIPID PANEL: CPT | Mod: ,,, | Performed by: CLINICAL MEDICAL LABORATORY

## 2022-12-22 PROCEDURE — 1160F PR REVIEW ALL MEDS BY PRESCRIBER/CLIN PHARMACIST DOCUMENTED: ICD-10-PCS | Mod: CPTII,,, | Performed by: NURSE PRACTITIONER

## 2022-12-22 PROCEDURE — 99213 PR OFFICE/OUTPT VISIT, EST, LEVL III, 20-29 MIN: ICD-10-PCS | Mod: 25,,, | Performed by: NURSE PRACTITIONER

## 2022-12-22 PROCEDURE — 84443 ASSAY THYROID STIM HORMONE: CPT | Mod: ,,, | Performed by: CLINICAL MEDICAL LABORATORY

## 2022-12-22 PROCEDURE — 84443 TSH: ICD-10-PCS | Mod: ,,, | Performed by: CLINICAL MEDICAL LABORATORY

## 2022-12-22 PROCEDURE — 82306 VITAMIN D 25 HYDROXY: CPT | Mod: ,,, | Performed by: CLINICAL MEDICAL LABORATORY

## 2022-12-22 PROCEDURE — 3008F PR BODY MASS INDEX (BMI) DOCUMENTED: ICD-10-PCS | Mod: CPTII,,, | Performed by: NURSE PRACTITIONER

## 2022-12-22 PROCEDURE — 87491 CHLMYD TRACH DNA AMP PROBE: CPT | Mod: ,,, | Performed by: CLINICAL MEDICAL LABORATORY

## 2022-12-22 PROCEDURE — 1159F PR MEDICATION LIST DOCUMENTED IN MEDICAL RECORD: ICD-10-PCS | Mod: CPTII,,, | Performed by: NURSE PRACTITIONER

## 2022-12-22 PROCEDURE — 82306 VITAMIN D: ICD-10-PCS | Mod: ,,, | Performed by: CLINICAL MEDICAL LABORATORY

## 2022-12-22 PROCEDURE — 80053 COMPREHENSIVE METABOLIC PANEL: ICD-10-PCS | Mod: ,,, | Performed by: CLINICAL MEDICAL LABORATORY

## 2022-12-22 PROCEDURE — 3074F PR MOST RECENT SYSTOLIC BLOOD PRESSURE < 130 MM HG: ICD-10-PCS | Mod: CPTII,,, | Performed by: NURSE PRACTITIONER

## 2022-12-22 PROCEDURE — 87591 N.GONORRHOEAE DNA AMP PROB: CPT | Mod: ,,, | Performed by: CLINICAL MEDICAL LABORATORY

## 2022-12-22 PROCEDURE — 3078F PR MOST RECENT DIASTOLIC BLOOD PRESSURE < 80 MM HG: ICD-10-PCS | Mod: CPTII,,, | Performed by: NURSE PRACTITIONER

## 2022-12-22 PROCEDURE — 80053 COMPREHEN METABOLIC PANEL: CPT | Mod: ,,, | Performed by: CLINICAL MEDICAL LABORATORY

## 2022-12-22 PROCEDURE — 3008F BODY MASS INDEX DOCD: CPT | Mod: CPTII,,, | Performed by: NURSE PRACTITIONER

## 2022-12-22 PROCEDURE — 86803 HEPATITIS C ANTIBODY: ICD-10-PCS | Mod: ,,, | Performed by: CLINICAL MEDICAL LABORATORY

## 2022-12-22 PROCEDURE — 90686 FLU VACCINE (QUAD) GREATER THAN OR EQUAL TO 3YO PRESERVATIVE FREE IM: ICD-10-PCS | Mod: ,,, | Performed by: NURSE PRACTITIONER

## 2022-12-22 PROCEDURE — 1160F RVW MEDS BY RX/DR IN RCRD: CPT | Mod: CPTII,,, | Performed by: NURSE PRACTITIONER

## 2022-12-22 PROCEDURE — 85025 COMPLETE CBC W/AUTO DIFF WBC: CPT | Mod: ,,, | Performed by: CLINICAL MEDICAL LABORATORY

## 2022-12-22 PROCEDURE — 86803 HEPATITIS C AB TEST: CPT | Mod: ,,, | Performed by: CLINICAL MEDICAL LABORATORY

## 2022-12-22 PROCEDURE — 87491 CHLAMYDIA/GONORRHOEAE(GC), PCR: ICD-10-PCS | Mod: ,,, | Performed by: CLINICAL MEDICAL LABORATORY

## 2022-12-22 PROCEDURE — 3074F SYST BP LT 130 MM HG: CPT | Mod: CPTII,,, | Performed by: NURSE PRACTITIONER

## 2022-12-22 PROCEDURE — 81001 URINALYSIS: ICD-10-PCS | Mod: ,,, | Performed by: CLINICAL MEDICAL LABORATORY

## 2022-12-22 PROCEDURE — 90471 IMMUNIZATION ADMIN: CPT | Mod: ,,, | Performed by: NURSE PRACTITIONER

## 2022-12-22 PROCEDURE — 90686 IIV4 VACC NO PRSV 0.5 ML IM: CPT | Mod: ,,, | Performed by: NURSE PRACTITIONER

## 2022-12-22 PROCEDURE — 3078F DIAST BP <80 MM HG: CPT | Mod: CPTII,,, | Performed by: NURSE PRACTITIONER

## 2022-12-22 PROCEDURE — 87591 CHLAMYDIA/GONORRHOEAE(GC), PCR: ICD-10-PCS | Mod: ,,, | Performed by: CLINICAL MEDICAL LABORATORY

## 2022-12-22 PROCEDURE — 81001 URINALYSIS AUTO W/SCOPE: CPT | Mod: ,,, | Performed by: CLINICAL MEDICAL LABORATORY

## 2022-12-22 PROCEDURE — 85025 CBC WITH DIFFERENTIAL: ICD-10-PCS | Mod: ,,, | Performed by: CLINICAL MEDICAL LABORATORY

## 2022-12-22 PROCEDURE — 90471 FLU VACCINE (QUAD) GREATER THAN OR EQUAL TO 3YO PRESERVATIVE FREE IM: ICD-10-PCS | Mod: ,,, | Performed by: NURSE PRACTITIONER

## 2022-12-22 PROCEDURE — 80061 LIPID PANEL: ICD-10-PCS | Mod: ,,, | Performed by: CLINICAL MEDICAL LABORATORY

## 2022-12-22 PROCEDURE — 99213 OFFICE O/P EST LOW 20 MIN: CPT | Mod: 25,,, | Performed by: NURSE PRACTITIONER

## 2022-12-22 PROCEDURE — 1159F MED LIST DOCD IN RCRD: CPT | Mod: CPTII,,, | Performed by: NURSE PRACTITIONER

## 2022-12-22 RX ORDER — ALBUTEROL SULFATE 90 UG/1
1-2 AEROSOL, METERED RESPIRATORY (INHALATION) EVERY 6 HOURS PRN
Qty: 18 G | Refills: 2 | Status: SHIPPED | OUTPATIENT
Start: 2022-12-22 | End: 2023-09-24 | Stop reason: SDUPTHER

## 2022-12-22 NOTE — LETTER
December 22, 2022      [facility]  [address] [place] [address]  Phone: [phone]  Fax: [phone]       Patient: Tulio Chance   YOB: 2000  Date of Visit: 12/22/2022    To Whom It May Concern:    Pablito Chance  was at [facility] on 12/22/2022. The patient may return to work/school on 12/23/22 with no restrictions. If you have any questions or concerns, or if I can be of further assistance, please do not hesitate to contact me.    Sincerely,    YANN Milian

## 2022-12-23 LAB
CHLAMYDIA BY PCR: NEGATIVE
N. GONORRHOEAE (GC) BY PCR: NEGATIVE

## 2022-12-27 ENCOUNTER — PATIENT MESSAGE (OUTPATIENT)
Dept: FAMILY MEDICINE | Facility: CLINIC | Age: 22
End: 2022-12-27
Payer: MEDICAID

## 2022-12-27 DIAGNOSIS — R79.89 ABNORMAL TSH: Primary | ICD-10-CM

## 2022-12-27 DIAGNOSIS — E55.9 VITAMIN D DEFICIENCY: ICD-10-CM

## 2022-12-27 RX ORDER — ERGOCALCIFEROL 1.25 MG/1
50000 CAPSULE ORAL
Qty: 12 CAPSULE | Refills: 0 | OUTPATIENT
Start: 2022-12-27 | End: 2023-03-29

## 2023-01-03 ENCOUNTER — OFFICE VISIT (OUTPATIENT)
Dept: FAMILY MEDICINE | Facility: CLINIC | Age: 23
End: 2023-01-03
Payer: MEDICAID

## 2023-01-03 VITALS
HEART RATE: 100 BPM | SYSTOLIC BLOOD PRESSURE: 100 MMHG | BODY MASS INDEX: 47.09 KG/M2 | RESPIRATION RATE: 18 BRPM | OXYGEN SATURATION: 99 % | DIASTOLIC BLOOD PRESSURE: 60 MMHG | TEMPERATURE: 99 F | WEIGHT: 293 LBS | HEIGHT: 66 IN

## 2023-01-03 DIAGNOSIS — Z11.1 SCREENING-PULMONARY TB: ICD-10-CM

## 2023-01-03 DIAGNOSIS — N91.2 AMENORRHEA: Primary | ICD-10-CM

## 2023-01-03 LAB
B-HCG UR QL: NEGATIVE
CTP QC/QA: YES

## 2023-01-03 PROCEDURE — 3078F PR MOST RECENT DIASTOLIC BLOOD PRESSURE < 80 MM HG: ICD-10-PCS | Mod: CPTII,,, | Performed by: NURSE PRACTITIONER

## 2023-01-03 PROCEDURE — 99213 PR OFFICE/OUTPT VISIT, EST, LEVL III, 20-29 MIN: ICD-10-PCS | Mod: ,,, | Performed by: NURSE PRACTITIONER

## 2023-01-03 PROCEDURE — 1159F MED LIST DOCD IN RCRD: CPT | Mod: CPTII,,, | Performed by: NURSE PRACTITIONER

## 2023-01-03 PROCEDURE — 3074F PR MOST RECENT SYSTOLIC BLOOD PRESSURE < 130 MM HG: ICD-10-PCS | Mod: CPTII,,, | Performed by: NURSE PRACTITIONER

## 2023-01-03 PROCEDURE — 3078F DIAST BP <80 MM HG: CPT | Mod: CPTII,,, | Performed by: NURSE PRACTITIONER

## 2023-01-03 PROCEDURE — 1160F PR REVIEW ALL MEDS BY PRESCRIBER/CLIN PHARMACIST DOCUMENTED: ICD-10-PCS | Mod: CPTII,,, | Performed by: NURSE PRACTITIONER

## 2023-01-03 PROCEDURE — 1160F RVW MEDS BY RX/DR IN RCRD: CPT | Mod: CPTII,,, | Performed by: NURSE PRACTITIONER

## 2023-01-03 PROCEDURE — 99213 OFFICE O/P EST LOW 20 MIN: CPT | Mod: ,,, | Performed by: NURSE PRACTITIONER

## 2023-01-03 PROCEDURE — 3008F BODY MASS INDEX DOCD: CPT | Mod: CPTII,,, | Performed by: NURSE PRACTITIONER

## 2023-01-03 PROCEDURE — 86580 TB INTRADERMAL TEST: CPT | Mod: RHCUB | Performed by: NURSE PRACTITIONER

## 2023-01-03 PROCEDURE — 81025 URINE PREGNANCY TEST: CPT | Mod: RHCUB | Performed by: NURSE PRACTITIONER

## 2023-01-03 PROCEDURE — 1159F PR MEDICATION LIST DOCUMENTED IN MEDICAL RECORD: ICD-10-PCS | Mod: CPTII,,, | Performed by: NURSE PRACTITIONER

## 2023-01-03 PROCEDURE — 3008F PR BODY MASS INDEX (BMI) DOCUMENTED: ICD-10-PCS | Mod: CPTII,,, | Performed by: NURSE PRACTITIONER

## 2023-01-03 PROCEDURE — 3074F SYST BP LT 130 MM HG: CPT | Mod: CPTII,,, | Performed by: NURSE PRACTITIONER

## 2023-01-03 NOTE — PROGRESS NOTES
"New clinic note    Tulio Chance is a 22 y.o. female     Chief Complaint:   Chief Complaint   Patient presents with    Follow-up     Pregnancy test , and tb skin test for school  if negative pregnancy test        Subjective:    Patient reports she needs a tb skin test for school. Reports she has had one in the past with no reaction. Denies cough.  Patient states lmp 12-1-22.    Follow-up  Pertinent negatives include no abdominal pain, chest pain, coughing or fever.      Allergies:   Review of patient's allergies indicates:  No Known Allergies     Past Medical History:  Past Medical History:   Diagnosis Date    Appendicitis     Asthma 12/22/2022    Breast lump     Breast mass         Current Medications:    Current Outpatient Medications:     albuterol (PROVENTIL/VENTOLIN HFA) 90 mcg/actuation inhaler, Inhale 1-2 puffs into the lungs every 6 (six) hours as needed for Wheezing. Rescue, Disp: 18 g, Rfl: 2    ergocalciferol (ERGOCALCIFEROL) 50,000 unit Cap, Take 1 capsule (50,000 Units total) by mouth every 7 days. Once complete get OTC Vitamin D3 2,000u daily, Disp: 12 capsule, Rfl: 0    thymol/chlorophyllin (CHLOROPHYLL ORAL), Take by mouth., Disp: , Rfl:        Review of Systems   Constitutional:  Negative for fever.   Respiratory:  Negative for cough and shortness of breath.    Cardiovascular:  Negative for chest pain.   Gastrointestinal:  Negative for abdominal pain.   Genitourinary:  Negative for vaginal bleeding.        Objective:    /60 (BP Location: Left arm, Patient Position: Sitting, BP Method: Large (Manual))   Pulse 100   Temp 99.1 °F (37.3 °C) (Oral)   Resp 18   Ht 5' 6" (1.676 m)   Wt 135.9 kg (299 lb 9.6 oz)   SpO2 99%   BMI 48.36 kg/m²      Physical Exam  Constitutional:       Appearance: Normal appearance. She is obese.   Eyes:      Extraocular Movements: Extraocular movements intact.   Cardiovascular:      Rate and Rhythm: Normal rate and regular rhythm.      Pulses: Normal pulses.     "  Heart sounds: Normal heart sounds.   Pulmonary:      Effort: Pulmonary effort is normal.      Breath sounds: Normal breath sounds.   Neurological:      Mental Status: She is alert and oriented to person, place, and time.        Assessment and Plan:    1. Amenorrhea    2. Screening-pulmonary TB         Amenorrhea  -     POCT urine pregnancy  -urine preg negative    Screening-pulmonary TB  -     POCT TB Skin Test Read  -tb skin test placed         There are no Patient Instructions on file for this visit.   Follow up if symptoms worsen or fail to improve.

## 2023-01-04 ENCOUNTER — PATIENT OUTREACH (OUTPATIENT)
Dept: ADMINISTRATIVE | Facility: HOSPITAL | Age: 23
End: 2023-01-04

## 2023-01-04 NOTE — PROGRESS NOTES
Non-compliant report chart audits for CERVICAL CANCER SCREENING. Chart review completed for HM test overdue (mammograms, Colonoscopies, pap smears, DM labs, and/or EYE EXAMs)      Care Everywhere and media, updates requested and reviewed.      [facility] and [employer] reviewed.        Pap scheduled 9/21/2023

## 2023-01-05 ENCOUNTER — CLINICAL SUPPORT (OUTPATIENT)
Dept: FAMILY MEDICINE | Facility: CLINIC | Age: 23
End: 2023-01-05
Payer: MEDICAID

## 2023-01-05 DIAGNOSIS — Z11.1 SCREENING-PULMONARY TB: Primary | ICD-10-CM

## 2023-01-05 LAB
TB INDURATION - 48 HR READ: 0 MM
TB INDURATION - 72 HR READ: NORMAL
TB SKIN TEST - 48 HR READ: NEGATIVE
TB SKIN TEST - 72 HR READ: NORMAL

## 2023-01-28 ENCOUNTER — CLINICAL SUPPORT (OUTPATIENT)
Dept: FAMILY MEDICINE | Facility: CLINIC | Age: 23
End: 2023-01-28
Payer: MEDICAID

## 2023-01-28 NOTE — PROGRESS NOTES
Patient came in for 2nd step tb test.  Last reading was on 01/05/2023 for first step. Patient was made aware she should have came back a week later for second stick.  As of today's date it has been 28 days since the first reading.  I instructed patient to call or come by on Monday and talk with someone about possibly starting over.

## 2023-01-30 ENCOUNTER — CLINICAL SUPPORT (OUTPATIENT)
Dept: FAMILY MEDICINE | Facility: CLINIC | Age: 23
End: 2023-01-30
Payer: MEDICAID

## 2023-01-30 VITALS
HEART RATE: 100 BPM | RESPIRATION RATE: 18 BRPM | TEMPERATURE: 99 F | OXYGEN SATURATION: 99 % | SYSTOLIC BLOOD PRESSURE: 128 MMHG | DIASTOLIC BLOOD PRESSURE: 78 MMHG

## 2023-01-30 DIAGNOSIS — Z11.1 SCREENING-PULMONARY TB: Primary | ICD-10-CM

## 2023-01-30 PROCEDURE — 86580 TB INTRADERMAL TEST: CPT | Mod: RHCUB | Performed by: FAMILY MEDICINE

## 2023-02-01 LAB
TB INDURATION - 48 HR READ: NORMAL
TB INDURATION - 72 HR READ: NORMAL
TB SKIN TEST - 48 HR READ: NORMAL
TB SKIN TEST - 72 HR READ: NORMAL

## 2023-02-07 ENCOUNTER — CLINICAL SUPPORT (OUTPATIENT)
Dept: FAMILY MEDICINE | Facility: CLINIC | Age: 23
End: 2023-02-07
Payer: MEDICAID

## 2023-02-07 VITALS
HEIGHT: 66 IN | DIASTOLIC BLOOD PRESSURE: 80 MMHG | WEIGHT: 293 LBS | BODY MASS INDEX: 47.09 KG/M2 | TEMPERATURE: 98 F | SYSTOLIC BLOOD PRESSURE: 133 MMHG | RESPIRATION RATE: 20 BRPM | OXYGEN SATURATION: 99 % | HEART RATE: 110 BPM

## 2023-02-07 DIAGNOSIS — Z11.1 SCREENING-PULMONARY TB: Primary | ICD-10-CM

## 2023-02-07 PROCEDURE — 86580 TB INTRADERMAL TEST: CPT | Mod: RHCUB | Performed by: FAMILY MEDICINE

## 2023-02-09 ENCOUNTER — CLINICAL SUPPORT (OUTPATIENT)
Dept: FAMILY MEDICINE | Facility: CLINIC | Age: 23
End: 2023-02-09
Payer: MEDICAID

## 2023-02-09 DIAGNOSIS — Z11.1 SCREENING-PULMONARY TB: Primary | ICD-10-CM

## 2023-02-09 LAB
TB INDURATION - 48 HR READ: 2 MM
TB INDURATION - 72 HR READ: NORMAL
TB SKIN TEST - 48 HR READ: NEGATIVE
TB SKIN TEST - 72 HR READ: NORMAL

## 2023-03-04 ENCOUNTER — HOSPITAL ENCOUNTER (EMERGENCY)
Facility: HOSPITAL | Age: 23
Discharge: HOME | End: 2023-03-04
Payer: MEDICAID

## 2023-03-04 VITALS
OXYGEN SATURATION: 99 % | HEIGHT: 66 IN | HEART RATE: 93 BPM | DIASTOLIC BLOOD PRESSURE: 79 MMHG | SYSTOLIC BLOOD PRESSURE: 121 MMHG | BODY MASS INDEX: 45 KG/M2 | TEMPERATURE: 99 F | RESPIRATION RATE: 18 BRPM | WEIGHT: 280 LBS

## 2023-03-04 DIAGNOSIS — K12.1 ULCERATION OF ORAL MUCOSA: Primary | ICD-10-CM

## 2023-03-04 PROCEDURE — 99282 EMERGENCY DEPT VISIT SF MDM: CPT

## 2023-03-04 PROCEDURE — 99283 PR EMERGENCY DEPT VISIT,LEVEL III: ICD-10-PCS | Mod: ,,, | Performed by: NURSE PRACTITIONER

## 2023-03-04 PROCEDURE — 99283 EMERGENCY DEPT VISIT LOW MDM: CPT | Mod: ,,, | Performed by: NURSE PRACTITIONER

## 2023-03-05 NOTE — DISCHARGE INSTRUCTIONS
Follow up with primary care provider in 2-3 days if no improvement.  Avoid acidic foods.   Salt water mouth rinse a few times daily.   Return to emergency department for any future emergencies.

## 2023-03-05 NOTE — ED PROVIDER NOTES
Encounter Date: 3/4/2023       History     Chief Complaint   Patient presents with    Dental Pain     Tulio Chance is a 22 y.o. Black or  /female presenting to ED with burning pain to right upper oral mucosa for 2 days. Pain only occurs when she is eating. Currently in NAD. VSS at this time.      The history is provided by the patient.   Review of patient's allergies indicates:  No Known Allergies  Past Medical History:   Diagnosis Date    Appendicitis     Asthma 12/22/2022    Breast lump     Breast mass      Past Surgical History:   Procedure Laterality Date    APPENDECTOMY      BREAST BIOPSY Right 5/3/2021    Procedure: BIOPSY, BREAST, WITH LUMPECTOMY;  Surgeon: Eva Farmer MD;  Location: [facility] OR;  Service: General;  Laterality: Right;    TONSILLECTOMY Bilateral 11/19/2021    Procedure: TONSILLECTOMY;  Surgeon: Heber Tatum MD;  Location: [facility] OR;  Service: ENT;  Laterality: Bilateral;     Family History   Problem Relation Age of Onset    Hypertension Mother     Heart disease Father     Hypertension Father     Asthma Brother     Diabetes Maternal Grandmother     Hypertension Maternal Grandmother     Heart disease Maternal Grandfather     Hypertension Maternal Grandfather      Social History     Tobacco Use    Smoking status: Every Day     Types: Vaping with nicotine    Smokeless tobacco: Never   Substance Use Topics    Alcohol use: Not Currently    Drug use: Never     Review of Systems   Constitutional:  Negative for appetite change, chills and fever.   HENT:  Negative for dental problem, drooling, facial swelling, sore throat and trouble swallowing.    Eyes:  Negative for visual disturbance.   All other systems reviewed and are negative.    Physical Exam     Initial Vitals [03/04/23 2303]   BP Pulse Resp Temp SpO2   121/79 93 18 98.5 °F (36.9 °C) 99 %      MAP       --         Physical Exam    Constitutional: She appears well-developed and well-nourished. No distress.   HENT:    Mouth/Throat: Uvula is midline, oropharynx is clear and moist and mucous membranes are normal. Normal dentition.       0.5 cm x 0.5 cm ulceration to right upper oral mucosa   Cardiovascular:  Normal rate, regular rhythm and normal heart sounds.           Pulmonary/Chest: Breath sounds normal. No respiratory distress.         Medical Screening Exam   See Full Note    ED Course   Procedures  Labs Reviewed - No data to display       Imaging Results    None          Medications - No data to display  Medical Decision Making:   Initial Assessment:   Tulio Chance is a 22 y.o. Black or  /female presenting to ED with burning pain to right upper oral mucosa for 2 days. Pain only occurs when she is eating. Currently in NAD. VSS at this time.    Differential Diagnosis:   Dental problem  Oral lesion  ED Management:  At this time, I do not feel that radiology studies or lab will add any benefit to care or diagnostics since there is no reported injury, signs of trauma or signs/sx of infection. Findings consistent with small ulceration of oral mucosa. I feel that patient has reached maximum benefit from ED evaluation, treatment and observation. I thoroughly explained all findings to patient to the best of my ability and answered all questions to their satisfaction. I educated patient on the general/expected course of the condition and treatment options in ED and after discharge. I also informed patient that our evaluation in the emergency department today is an evaluation of the condition in one moment in time. If there is any worsening of the condition of if symptoms persist, the patient has been instructed to return to the ED immediately for further evaluation. Patient has also been advised to follow up with PCP in 2-3 days for re-evaluation. Patient verbalized understanding of the instructions and is agreeable to disposition. No questions or concerns at this time.     Dx:  ulceration of oral mucosa                  Clinical Impression:   Final diagnoses:  [K12.1] Ulceration of oral mucosa (Primary)        ED Disposition Condition    Discharge Stable          ED Prescriptions    None       Follow-up Information    None          YANN Resendiz  03/04/23 7876

## 2023-03-05 NOTE — ED TRIAGE NOTES
Pt presents to ED with c/o dental pain to upper right side since Thursday. Pt states she took tylenol 500mg as the box directed on Thursday and Friday without relief. Pt has been using a sore throat spray and orajel today which she states has helped some. Pt denies sore throat or any other symptoms. Pt currently denies pain, but states her pain is only present when chewing and rates it as a 7. Pt is afebrile and all VS are WNL.

## 2023-03-27 PROBLEM — Z13.1 ENCOUNTER FOR SCREENING FOR DIABETES MELLITUS: Status: RESOLVED | Noted: 2022-12-22 | Resolved: 2023-03-27

## 2023-03-27 PROBLEM — Z13.220 ENCOUNTER FOR SCREENING FOR LIPID DISORDER: Status: RESOLVED | Noted: 2022-12-22 | Resolved: 2023-03-27

## 2023-03-27 PROBLEM — Z00.00 ROUTINE GENERAL MEDICAL EXAMINATION AT A HEALTH CARE FACILITY: Status: RESOLVED | Noted: 2022-12-22 | Resolved: 2023-03-27

## 2023-03-29 ENCOUNTER — HOSPITAL ENCOUNTER (EMERGENCY)
Facility: HOSPITAL | Age: 23
Discharge: HOME | End: 2023-03-29
Payer: MEDICAID

## 2023-03-29 VITALS
HEIGHT: 66 IN | HEART RATE: 96 BPM | OXYGEN SATURATION: 99 % | WEIGHT: 285 LBS | DIASTOLIC BLOOD PRESSURE: 85 MMHG | BODY MASS INDEX: 45.8 KG/M2 | RESPIRATION RATE: 16 BRPM | SYSTOLIC BLOOD PRESSURE: 129 MMHG | TEMPERATURE: 98 F

## 2023-03-29 DIAGNOSIS — K21.9 GASTROESOPHAGEAL REFLUX DISEASE WITHOUT ESOPHAGITIS: Primary | ICD-10-CM

## 2023-03-29 PROCEDURE — 99284 PR EMERGENCY DEPT VISIT,LEVEL IV: ICD-10-PCS | Mod: ,,, | Performed by: NURSE PRACTITIONER

## 2023-03-29 PROCEDURE — 99283 EMERGENCY DEPT VISIT LOW MDM: CPT

## 2023-03-29 PROCEDURE — 99284 EMERGENCY DEPT VISIT MOD MDM: CPT | Mod: ,,, | Performed by: NURSE PRACTITIONER

## 2023-03-29 PROCEDURE — 25000003 PHARM REV CODE 250: Performed by: NURSE PRACTITIONER

## 2023-03-29 RX ORDER — MAG HYDROX/ALUMINUM HYD/SIMETH 200-200-20
30 SUSPENSION, ORAL (FINAL DOSE FORM) ORAL
Status: COMPLETED | OUTPATIENT
Start: 2023-03-29 | End: 2023-03-29

## 2023-03-29 RX ORDER — OMEPRAZOLE 40 MG/1
40 CAPSULE, DELAYED RELEASE ORAL DAILY
Qty: 30 CAPSULE | Refills: 0 | OUTPATIENT
Start: 2023-03-29 | End: 2023-09-06

## 2023-03-29 RX ADMIN — ALUMINUM HYDROXIDE, MAGNESIUM HYDROXIDE, AND SIMETHICONE 30 ML: 200; 200; 20 SUSPENSION ORAL at 12:03

## 2023-03-29 NOTE — DISCHARGE INSTRUCTIONS
Follow up with primary care provider in 2-3 days for re-evaluation.   Avoid spicy or greasy foods, carbonated beverages.   Increase water intake.   No smoking or vaping.  Begin Prilosec daily.  Return to emergency department for any new or worsening symptoms, vomiting, unable to hold down food or drink or any other worrisome symptoms.

## 2023-03-29 NOTE — ED TRIAGE NOTES
"Patient reports sensation of "something" in her throat x 1-2 days. Able to eat and drink. Has been told by medical provider not to lay down soon after eating.  "

## 2023-03-29 NOTE — Clinical Note
"Tulio Spaulding" Robson was seen and treated in our emergency department on 3/29/2023.  She may return to work on 03/30/2023.       If you have any questions or concerns, please don't hesitate to call.      YANN Resendiz" [FreeTextEntry1] : Complete 10 days of antibiotic. Provide ibuprofen as needed for pain or fever. If no improvement within 48 hours return for re-evaluation. Follow up in 2-3 wks for tympanometry.\par Has appt with ENT next week, may follow up there

## 2023-03-29 NOTE — ED PROVIDER NOTES
"Encounter Date: 3/29/2023       History     Chief Complaint   Patient presents with    Sore Throat     Tulio Chance is a 22 y.o. Black or  /female presenting to ED with sensation of "gurgling in her throat" for 2 days. Sx worse at night when she lies down. Similar sx in the past and was told that she had acid reflux. She was given a prescription for sx but states that she has not taken it since the prescription ran out. She denies vomiting. She is able to alex food and fluid without vomiting. Currently in NAD. VSS at this time.      The history is provided by the patient.   Sore Throat   Pertinent negatives include no abdominal pain, congestion, coughing, diarrhea, shortness of breath, stridor or vomiting.   Review of patient's allergies indicates:  No Known Allergies  Past Medical History:   Diagnosis Date    Appendicitis     Asthma 12/22/2022    Breast lump     Breast mass      Past Surgical History:   Procedure Laterality Date    APPENDECTOMY      BREAST BIOPSY Right 5/3/2021    Procedure: BIOPSY, BREAST, WITH LUMPECTOMY;  Surgeon: Eva Farmer MD;  Location: [facility] OR;  Service: General;  Laterality: Right;    TONSILLECTOMY Bilateral 11/19/2021    Procedure: TONSILLECTOMY;  Surgeon: Heber Tatum MD;  Location: [facility] OR;  Service: ENT;  Laterality: Bilateral;     Family History   Problem Relation Age of Onset    Hypertension Mother     Heart disease Father     Hypertension Father     Asthma Brother     Diabetes Maternal Grandmother     Hypertension Maternal Grandmother     Heart disease Maternal Grandfather     Hypertension Maternal Grandfather      Social History     Tobacco Use    Smoking status: Former     Types: Vaping with nicotine    Smokeless tobacco: Never   Substance Use Topics    Alcohol use: Not Currently    Drug use: Never     Review of Systems   Constitutional:  Negative for activity change, appetite change, chills, diaphoresis, fatigue and fever.   HENT:  Negative " "for congestion, postnasal drip and sore throat.    Respiratory:  Negative for apnea, cough, choking, chest tightness, shortness of breath and stridor.    Cardiovascular:  Negative for chest pain and palpitations.   Gastrointestinal:  Negative for abdominal pain, diarrhea, nausea and vomiting.   Neurological:  Negative for dizziness and light-headedness.   All other systems reviewed and are negative.    Physical Exam     Initial Vitals [03/29/23 0029]   BP Pulse Resp Temp SpO2   129/85 96 16 98.3 °F (36.8 °C) 99 %      MAP       --         Physical Exam    Nursing note and vitals reviewed.  Constitutional: She appears well-developed and well-nourished. She is not diaphoretic. No distress.   HENT:   Head: Normocephalic.   Nose: Nose normal.   Mouth/Throat: Oropharynx is clear and moist.   Eyes: EOM are normal. Pupils are equal, round, and reactive to light.   Neck: Neck supple.   Normal range of motion.  Cardiovascular:  Normal rate, regular rhythm and normal heart sounds.           Pulmonary/Chest: Breath sounds normal. No respiratory distress.   Musculoskeletal:      Cervical back: Normal range of motion and neck supple.     Neurological: She is alert and oriented to person, place, and time. GCS score is 15. GCS eye subscore is 4. GCS verbal subscore is 5. GCS motor subscore is 6.   Skin: Skin is warm and dry. Capillary refill takes less than 2 seconds.       Medical Screening Exam   See Full Note    ED Course   Procedures  Labs Reviewed - No data to display       Imaging Results    None          Medications   aluminum-magnesium hydroxide-simethicone 200-200-20 mg/5 mL suspension 30 mL (30 mLs Oral Given 3/29/23 0045)     Medical Decision Making:   Initial Assessment:   Tulio Chance is a 22 y.o. Black or  /female presenting to ED with sensation of "gurgling in her throat" for 2 days. Sx worse at night when she lies down. Similar sx in the past and was told that she had acid reflux. She was given " a prescription for sx but states that she has not taken it since the prescription ran out. She denies vomiting. She is able to alex food and fluid without vomiting. Currently in NAD. VSS at this time.    Differential Diagnosis:   GERD  FB  ED Management:  Maalox 30 ml PO- alex well without vomiting  Work excuse for tonight  At this time, I do not feel that radiology studies or lab will add any benefit to care or diagnostics since there is no reported injury, signs of trauma or signs/sx of infection. She has been alex PO food and fluid without vomiting. Hx of GERD but has been out of medication. I feel that patient has reached maximum benefit from ED evaluation, treatment and observation. I thoroughly explained all findings to patient to the best of my ability and answered all questions to their satisfaction. I educated patient on the general/expected course of the condition and treatment options in ED and after discharge. I also informed patient that our evaluation in the emergency department today is an evaluation of the condition in one moment in time. If there is any worsening of the condition of if symptoms persist, the patient has been instructed to return to the ED immediately for further evaluation. Patient has also been advised to follow up with PCP in 2-3 days for re-evaluation. Patient verbalized understanding of the instructions and is agreeable to disposition. No questions or concerns at this time.     Dx:  GERD                 Clinical Impression:   Final diagnoses:  [K21.9] Gastroesophageal reflux disease without esophagitis (Primary)        ED Disposition Condition    Discharge Stable          ED Prescriptions       Medication Sig Dispense Start Date End Date Auth. Provider    omeprazole (PRILOSEC) 40 MG capsule Take 1 capsule (40 mg total) by mouth once daily. 30 capsule 3/29/2023 4/28/2023 YANN Resendiz          Follow-up Information    None          YANN Resendiz  03/29/23 0052        Greta Juares, [facility]  03/29/23 0053

## 2023-06-13 NOTE — DISCHARGE INSTRUCTIONS
Follow up with your primary care provider in 24 to 48 hours if worsening or not improving. Treat symptoms.  Recommend Mucinex D which is available behind the counter at the pharmacy.  Tylenol/ ibuprofen as directed. Drink plenty of water. Return to the ER as needed.     · Obtain records of current therapy  · Ct with 1.6 lll nodule and mediastinal/hilar lad.

## 2023-07-17 ENCOUNTER — PATIENT OUTREACH (OUTPATIENT)
Dept: ADMINISTRATIVE | Facility: HOSPITAL | Age: 23
End: 2023-07-17

## 2023-07-17 NOTE — PROGRESS NOTES
07/17/2023   --Chart accessed for: Cervical Cancer Screening Audit  --Care Gaps addressed: All HM Topics  Outreach made to patient via Chart Review  Patient stated N/A  Care Everywhere updates requested and reviewed.  Media reports reviewed.  [facility] and [facility] reviewed.  [facility] Content reviewed.  Immunization Database (Immprint/MIXX) reviewed. Vaccinations uploaded: Tdap, HPV, Covid  Schedule for Pap with Dr. Wetzel 09/20/2023.    Health Maintenance Due   Topic Date Due    Pap Smear  Never done

## 2023-09-06 ENCOUNTER — HOSPITAL ENCOUNTER (EMERGENCY)
Facility: HOSPITAL | Age: 23
Discharge: HOME | End: 2023-09-06
Attending: EMERGENCY MEDICINE
Payer: MEDICAID

## 2023-09-06 VITALS
BODY MASS INDEX: 45.64 KG/M2 | RESPIRATION RATE: 16 BRPM | HEART RATE: 93 BPM | HEIGHT: 66 IN | OXYGEN SATURATION: 99 % | TEMPERATURE: 98 F | WEIGHT: 284 LBS | DIASTOLIC BLOOD PRESSURE: 76 MMHG | SYSTOLIC BLOOD PRESSURE: 125 MMHG

## 2023-09-06 DIAGNOSIS — A59.01 TRICHOMONAL VAGINITIS: Primary | ICD-10-CM

## 2023-09-06 DIAGNOSIS — N30.00 ACUTE CYSTITIS WITHOUT HEMATURIA: ICD-10-CM

## 2023-09-06 DIAGNOSIS — R19.7 DIARRHEA, UNSPECIFIED TYPE: ICD-10-CM

## 2023-09-06 DIAGNOSIS — R11.10 VOMITING, UNSPECIFIED VOMITING TYPE, UNSPECIFIED WHETHER NAUSEA PRESENT: ICD-10-CM

## 2023-09-06 LAB
BACTERIA #/AREA URNS HPF: ABNORMAL /HPF
BILIRUB UR QL STRIP: NEGATIVE
CLARITY UR: ABNORMAL
COLOR UR: YELLOW
FLUAV AG UPPER RESP QL IA.RAPID: NEGATIVE
FLUBV AG UPPER RESP QL IA.RAPID: NEGATIVE
GLUCOSE UR STRIP-MCNC: NEGATIVE MG/DL
HCG UR QL IA.RAPID: NEGATIVE
HYALINE CASTS #/AREA URNS LPF: ABNORMAL /LPF
KETONES UR STRIP-SCNC: ABNORMAL MG/DL
LEUKOCYTE ESTERASE UR QL STRIP: ABNORMAL
MUCOUS THREADS #/AREA URNS HPF: ABNORMAL /HPF
NITRITE UR QL STRIP: NEGATIVE
PH UR STRIP: 6 PH UNITS
PROT UR QL STRIP: ABNORMAL
RBC # UR STRIP: NEGATIVE /UL
RBC #/AREA URNS HPF: ABNORMAL /HPF
SARS-COV-2 RDRP RESP QL NAA+PROBE: NEGATIVE
SP GR UR STRIP: 1.02
SQUAMOUS #/AREA URNS LPF: ABNORMAL /LPF
TRICHOMONAS #/AREA URNS HPF: ABNORMAL /HPF
UROBILINOGEN UR STRIP-ACNC: 0.2 MG/DL
WBC #/AREA URNS HPF: ABNORMAL /HPF

## 2023-09-06 PROCEDURE — 99284 EMERGENCY DEPT VISIT MOD MDM: CPT

## 2023-09-06 PROCEDURE — 87591 N.GONORRHOEAE DNA AMP PROB: CPT | Performed by: EMERGENCY MEDICINE

## 2023-09-06 PROCEDURE — 99284 PR EMERGENCY DEPT VISIT,LEVEL IV: ICD-10-PCS | Mod: ,,, | Performed by: EMERGENCY MEDICINE

## 2023-09-06 PROCEDURE — 87804 INFLUENZA ASSAY W/OPTIC: CPT | Performed by: EMERGENCY MEDICINE

## 2023-09-06 PROCEDURE — 99284 EMERGENCY DEPT VISIT MOD MDM: CPT | Mod: ,,, | Performed by: EMERGENCY MEDICINE

## 2023-09-06 PROCEDURE — 81001 URINALYSIS AUTO W/SCOPE: CPT | Performed by: EMERGENCY MEDICINE

## 2023-09-06 PROCEDURE — 87086 URINE CULTURE/COLONY COUNT: CPT | Performed by: EMERGENCY MEDICINE

## 2023-09-06 PROCEDURE — 81025 URINE PREGNANCY TEST: CPT | Performed by: EMERGENCY MEDICINE

## 2023-09-06 PROCEDURE — 87635 SARS-COV-2 COVID-19 AMP PRB: CPT | Performed by: EMERGENCY MEDICINE

## 2023-09-06 RX ORDER — FAMOTIDINE 20 MG/1
20 TABLET, FILM COATED ORAL 2 TIMES DAILY
Qty: 28 TABLET | Refills: 0 | Status: SHIPPED | OUTPATIENT
Start: 2023-09-06 | End: 2024-02-05

## 2023-09-06 RX ORDER — CEPHALEXIN 500 MG/1
500 CAPSULE ORAL EVERY 6 HOURS
Qty: 20 CAPSULE | Refills: 0 | Status: SHIPPED | OUTPATIENT
Start: 2023-09-06 | End: 2023-09-11

## 2023-09-06 RX ORDER — ONDANSETRON 4 MG/1
4 TABLET, FILM COATED ORAL EVERY 8 HOURS PRN
Qty: 10 TABLET | Refills: 0 | Status: SHIPPED | OUTPATIENT
Start: 2023-09-06 | End: 2023-09-06 | Stop reason: SDUPTHER

## 2023-09-06 RX ORDER — METRONIDAZOLE 500 MG/1
500 TABLET ORAL EVERY 12 HOURS
Qty: 14 TABLET | Refills: 0 | Status: SHIPPED | OUTPATIENT
Start: 2023-09-06 | End: 2023-09-06 | Stop reason: SDUPTHER

## 2023-09-06 RX ORDER — ONDANSETRON 4 MG/1
4 TABLET, FILM COATED ORAL EVERY 8 HOURS PRN
Qty: 10 TABLET | Refills: 0 | Status: SHIPPED | OUTPATIENT
Start: 2023-09-06 | End: 2023-09-09

## 2023-09-06 RX ORDER — METRONIDAZOLE 500 MG/1
500 TABLET ORAL EVERY 12 HOURS
Qty: 14 TABLET | Refills: 0 | Status: SHIPPED | OUTPATIENT
Start: 2023-09-06 | End: 2023-09-13

## 2023-09-06 RX ORDER — FAMOTIDINE 20 MG/1
20 TABLET, FILM COATED ORAL 2 TIMES DAILY
Qty: 28 TABLET | Refills: 0 | Status: SHIPPED | OUTPATIENT
Start: 2023-09-06 | End: 2023-09-06 | Stop reason: SDUPTHER

## 2023-09-06 RX ORDER — CEPHALEXIN 500 MG/1
500 CAPSULE ORAL EVERY 6 HOURS
Qty: 20 CAPSULE | Refills: 0 | Status: SHIPPED | OUTPATIENT
Start: 2023-09-06 | End: 2023-09-06 | Stop reason: SDUPTHER

## 2023-09-06 NOTE — Clinical Note
"Tulio Hopedaniella" Robson was seen and treated in our emergency department on 9/6/2023.  She may return to school on 09/07/2023.      If you have any questions or concerns, please don't hesitate to call.      Mary Kate Bobby MD"

## 2023-09-06 NOTE — DISCHARGE INSTRUCTIONS
INCREASE REST AND FLUIDS  MEDICATIONS AS DIRECTED  OVER THE COUNTER TYLENOL AS NEEDED FOR HEADACHE, IMMODIUM AS NEEDED FOR DIARRHEA  CLEAR LIQUIDS FOR 1 DAY THEN BLAND DIET  GONORRHEA AND CHLAMYDIA PENDING, RECOMMEND HIV TESTING AT HEALTH DEPARTMENT OR DISCUSS WITH DR ARIZA

## 2023-09-06 NOTE — ED TRIAGE NOTES
Patient to ER this morning with c/o diarrhea x 5 times yesterday.  Patient with c/o vomiting this morning 3 times.  Patient states she has been around someone with nausea.  Patient is in nursing school.  States she was told to come to ER and be seen by nursing instructor.

## 2023-09-06 NOTE — Clinical Note
"Tulio Hopedaniella" Robson was seen and treated in our emergency department on 9/6/2023.  She may return to school on 09/08/2023.      If you have any questions or concerns, please don't hesitate to call.      Mary Kate Bobby MD"

## 2023-09-06 NOTE — ED PROVIDER NOTES
Encounter Date: 9/6/2023       History     Chief Complaint   Patient presents with    Nausea    Vomiting    Diarrhea     C/O nausea and vomiting today, diarrhea yesterday     PT IS A 22 YR OLD BF WITH NAUSEA /VOMITING X 3 AND DIARRHEA  X 5 ONSET YESTERDAY AND HA THIS AM DESCRIBED AS MILD FRONTAL WITHOUT INCREASING OR DECREASING FACTORS.  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  PT DENIES FEVER, CHILLS, VOMITING, PALPITATIONS, SORE THROAT OR ADDITIONAL SYMPTOMS.  PT IS SEXUALLY ACTIVE AND IS NOT ON BC.    Past Medical History  Diagnosis Date   Breast lump [N63.0]    Breast mass [N63.0]    Appendicitis [K37]    Asthma [J45.909]            Review of patient's allergies indicates:  No Known Allergies  Past Medical History:   Diagnosis Date    Appendicitis     Asthma 12/22/2022    Breast lump     Breast mass      Past Surgical History:   Procedure Laterality Date    APPENDECTOMY      BREAST BIOPSY Right 5/3/2021    Procedure: BIOPSY, BREAST, WITH LUMPECTOMY;  Surgeon: Eva Farmer MD;  Location: [facility];  Service: General;  Laterality: Right;    TONSILLECTOMY Bilateral 11/19/2021    Procedure: TONSILLECTOMY;  Surgeon: Heber Tatum MD;  Location: [facility];  Service: ENT;  Laterality: Bilateral;     Family History   Problem Relation Age of Onset    Hypertension Mother     Heart disease Father     Hypertension Father     Asthma Brother     Diabetes Maternal Grandmother     Hypertension Maternal Grandmother     Heart disease Maternal Grandfather     Hypertension Maternal Grandfather      Social History     Tobacco Use    Smoking status: Former     Types: Vaping with nicotine    Smokeless tobacco: Never   Substance Use Topics    Alcohol use: Not Currently    Drug use: Never     Review of Systems   Constitutional: Negative.    HENT: Negative.     Eyes: Negative.    Respiratory: Negative.     Cardiovascular: Negative.    Gastrointestinal:   Positive for diarrhea and nausea.   Endocrine: Negative.    Genitourinary: Negative.    Musculoskeletal: Negative.    Skin: Negative.    Allergic/Immunologic: Negative.    Neurological:  Positive for headaches.   Hematological: Negative.    Psychiatric/Behavioral: Negative.         Physical Exam     Initial Vitals [09/06/23 0640]   BP Pulse Resp Temp SpO2   (!) 98/51 93 16 98.1 °F (36.7 °C) 99 %      MAP       --         Physical Exam    Nursing note and vitals reviewed.  Constitutional: She appears well-developed and well-nourished. She is cooperative. No distress.   HENT:   Head: Normocephalic and atraumatic.   Eyes: Conjunctivae and EOM are normal. Pupils are equal, round, and reactive to light.   Neck: Trachea normal. Neck supple.   Cardiovascular:  Normal rate, regular rhythm, normal heart sounds and intact distal pulses.           Pulses:       Radial pulses are 3+ on the right side and 3+ on the left side.        Dorsalis pedis pulses are 3+ on the right side and 3+ on the left side.   Pulmonary/Chest: Breath sounds normal. No respiratory distress. She has no wheezes.   Abdominal: Abdomen is soft. Bowel sounds are normal. She exhibits no distension.   Musculoskeletal:         General: Normal range of motion.      Right shoulder: Normal.      Left shoulder: Normal.      Right upper arm: Normal.      Left upper arm: Normal.      Right elbow: Normal.      Left elbow: Normal.      Right forearm: Normal.      Left forearm: Normal.      Right wrist: Normal.      Left wrist: Normal.      Right hand: Normal.      Left hand: Normal.      Cervical back: Neck supple.     Lymphadenopathy:     She has no cervical adenopathy.     She has no axillary adenopathy.   Neurological: She is alert and oriented to person, place, and time. She has normal strength. No cranial nerve deficit or sensory deficit. She displays a negative Romberg sign. GCS eye subscore is 4. GCS verbal subscore is 5. GCS motor subscore is 6.   Reflex  Scores:       Tricep reflexes are 4+ on the left side.       Bicep reflexes are 4+ on the right side and 4+ on the left side.       Brachioradialis reflexes are 4+ on the right side and 4+ on the left side.       Patellar reflexes are 4+ on the right side and 4+ on the left side.       Achilles reflexes are 4+ on the right side and 4+ on the left side.  Skin: Skin is warm and dry. Capillary refill takes less than 2 seconds.   Psychiatric: She has a normal mood and affect. Her speech is normal and behavior is normal. Judgment and thought content normal. Cognition and memory are normal.         Medical Screening Exam   See Full Note    ED Course   Procedures  Labs Reviewed   URINALYSIS, REFLEX TO URINE CULTURE - Abnormal; Notable for the following components:       Result Value    Leukocytes, UA Trace (*)     Protein, UA Trace (*)     All other components within normal limits   URINALYSIS, MICROSCOPIC - Abnormal; Notable for the following components:    WBC, UA 5-10 (*)     Bacteria, UA Moderate (*)     Squamous Epithelial Cells, UA Too Numerous To Count (*)     Mucus, UA Few (*)     Trichomonas, UA Moderate (*)     All other components within normal limits   RAPID INFLUENZA A/B - Normal   SARS-COV-2 RNA AMPLIFICATION, QUAL - Normal    Narrative:     Negative SARS-CoV results should not be used as the sole basis for treatment or patient management decisions; negative results should be considered in the context of a patient's recent exposures, history and the presene of clinical signs and symptoms consistent with COVID-19.  Negative results should be treated as presumptive and confirmed by molecular assay, if necessary for patient management.   HCG QUALITATIVE URINE - Normal   CULTURE, URINE   CHLAMYDIA/GONORRHOEAE(GC), PCR          Imaging Results    None          Medications - No data to display  Medical Decision Making  PT IS A 22 YR OLD BF WITH NAUSEA /VOMITING X 3 AND DIARRHEA  X 5 ONSET YESTERDAY AND HA THIS AM  DESCRIBED AS MILD FRONTAL WITHOUT INCREASING OR DECREASING FACTORS.  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  PT DENIES FEVER, CHILLS, VOMITING, PALPITATIONS, SORE THROAT OR ADDITIONAL SYMPTOMS.  PT IS SEXUALLY ACTIVE AND IS NOT ON BC.      Past Medical History  Diagnosis Date Comments  Breast lump [N63.0]    Breast mass [N63.0]    Appendicitis [K37]    Asthma [J45.909]   EXAM  LABS AS ABOVE  DC HOME         Amount and/or Complexity of Data Reviewed  Labs: ordered.     Details: New Results - Labs    Updated   Order   09/06/23 0725  Urinalysis, Reflex to Urine Culture Urine, Clean Catch   Collected: 09/06/23 0659  Final result  Specimen: Urine, Clean Catch      Color, UA Yellow  Clarity, UA Slightly Cloudy  pH, UA 6.0 pH Units  Leukocytes, UA Trace Abnormal   Nitrites, UA Negative  Protein, UA Trace Abnormal   Glucose, UA Negative mg/dL  Ketones, UA Trace mg/dL  Urobilinogen, UA 0.2 mg/dL  Bilirubin, UA Negative  Blood, UA Negative  Specific Gravity, UA 1.025       09/06/23 0721  Urinalysis, Microscopic   Collected: 09/06/23 0659  Final result  Specimen: Urine, Clean Catch      WBC, UA 5-10 Abnormal  /hpf  RBC, UA 0-3 /hpf  Bacteria, UA Moderate Abnormal  /hpf  Squamous Epithelial Cells, UA Too Numerous To Count Abnormal  /lpf  Mucus, UA Few Abnormal  /hpf  Trichomonas, UA Moderate Abnormal  /hpf  Hyaline Casts, UA None Seen /lpf       09/06/23 0721  Urine culture   Collected: 09/06/23 0659  In process  Specimen: Urine, Clean Catch         09/06/23 0724  Rapid Influenza A/B   Collected: 09/06/23 0657  Final result  Specimen: Nasopharyngeal from Nasal Swab      Influenza A Negative  Influenza B Negative       09/06/23 0722  COVID-19 Rapid Screening   Collected: 09/06/23 0657  Final result  Specimen: Nasal Swab      SARS COV-2 MOLECULAR Negati                                Medical Decision Making:   Initial Assessment:   PT IS A 22 YR OLD BF  WITH NAUSEA /VOMITING X 3 AND DIARRHEA  X 5 ONSET YESTERDAY AND HA THIS AM DESCRIBED AS MILD FRONTAL WITHOUT INCREASING OR DECREASING FACTORS.  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  PT DENIES FEVER, CHILLS, VOMITING, PALPITATIONS, SORE THROAT OR ADDITIONAL SYMPTOMS.  PT IS SEXUALLY ACTIVE AND IS NOT ON BC.    Differential Diagnosis:   AGE, COVID,DEHYDRATION, STREP, UTI  ABNORMAL LABS    Clinical Tests:   Lab Tests: Ordered and Reviewed       <> Summary of Lab: Viewed and Other Results - Labs    Updated   Order   09/06/23 0747  Chlamydia/GC, PCR   Collected: 09/06/23 0747  In process  Specimen: Urine from Vagina         09/06/23 0735  HCG Qualitative Urine   Collected: 09/06/23 0728  Final result  Specimen: Urine, Clean Catch      HCG Qualitative, Urine Negative       09/06/23 0725  Urinalysis, Reflex to Urine Culture Urine, Clean Catch   Collected: 09/06/23 0659  Final result  Specimen: Urine, Clean Catch      Color, UA Yellow  Clarity, UA Slightly Cloudy  pH, UA 6.0 pH Units  Leukocytes, UA Trace Abnormal   Nitrites, UA Negative  Protein, UA Trace Abnormal   Glucose, UA Negative mg/dL  Ketones, UA Trace mg/dL  Urobilinogen, UA 0.2 mg/dL  Bilirubin, UA Negative  Blood, UA Negative  Specific Gravity, UA 1.025       09/06/23 0721  Urinalysis, Microscopic   Collected: 09/06/23 0659  Final result  Specimen: Urine, Clean Catch      WBC, UA 5-10 Abnormal  /hpf  RBC, UA 0-3 /hpf  Bacteria, UA Moderate Abnormal  /hpf  Squamous Epithelial Cells, UA Too Numerous To Count Abnormal  /lpf  Mucus, UA Few Abnormal  /hpf  Trichomonas, UA Moderate Abnormal  /hpf  Hyaline Casts, UA None Seen /lpf       09/06/23 0721  Urine culture   Collected: 09/06/23 0659  In process  Specimen: Urine, Clean Catch         09/06/23 0724  Rapid Influenza A/B   Collected: 09/06/23 0657  Final result  Specimen: Nasopharyngeal from Nasal Swab      Influenza  A Negative  Influenza B Negative       09/06/23 0722  COVID-19 Rapid Screening   Collected: 09/06/23 0657  Final result  Specimen: Nasal Swab      SARS COV-2 MOLECULAR Negative        ED Management:  EXAM  LABS POS UTI, TRICH, NEG PREG  PT COUNSELED REGARDING STD FOLLOW UP INCLUDING HIV TESTING AND STATES SHE WILL FOLLOW UP WITH   DC HOME IN STABLE CONDITION      INCREASE REST AND FLUIDS  MEDICATIONS AS DIRECTED  OVER THE COUNTER TYLENOL AS NEEDED FOR HEADACHE, IMMODIUM AS NEEDED FOR DIARRHEA  CLEAR LIQUIDS FOR 1 DAY THEN BLAND DIET  GONORRHEA AND CHLAMYDIA PENDING, RECOMMEND HIV TESTING AT HEALTH DEPARTMENT OR DISCUSS WITH DR ARIZA        Clinical Impression:   Final diagnoses:  [A59.01] Trichomonal vaginitis (Primary)  [N30.00] Acute cystitis without hematuria  [R11.10] Vomiting, unspecified vomiting type, unspecified whether nausea present  [R19.7] Diarrhea, unspecified type        ED Disposition Condition    Discharge Stable          ED Prescriptions       Medication Sig Dispense Start Date End Date Auth. Provider    ondansetron (ZOFRAN) 4 MG tablet  (Status: Discontinued) Take 1 tablet (4 mg total) by mouth every 8 (eight) hours as needed for Nausea. 10 tablet 9/6/2023 9/6/2023 Mary Kate Bobby MD    famotidine (PEPCID) 20 MG tablet  (Status: Discontinued) Take 1 tablet (20 mg total) by mouth 2 (two) times daily. for 14 days 28 tablet 9/6/2023 9/6/2023 Mary Kate Bobby MD    cephALEXin (KEFLEX) 500 MG capsule  (Status: Discontinued) Take 1 capsule (500 mg total) by mouth every 6 (six) hours. for 5 days 20 capsule 9/6/2023 9/6/2023 Mary Kate Bobby MD    metroNIDAZOLE (FLAGYL) 500 MG tablet  (Status: Discontinued) Take 1 tablet (500 mg total) by mouth every 12 (twelve) hours. for 7 days 14 tablet 9/6/2023 9/6/2023 Mary Kate Bobby MD    cephALEXin (KEFLEX) 500 MG capsule Take 1 capsule (500 mg total) by mouth every 6 (six) hours. for 5 days 20 capsule 9/6/2023 9/11/2023 Mary Kate Bobby MD     famotidine (PEPCID) 20 MG tablet Take 1 tablet (20 mg total) by mouth 2 (two) times daily. for 14 days 28 tablet 9/6/2023 9/20/2023 Mary Kate Bobby MD    metroNIDAZOLE (FLAGYL) 500 MG tablet Take 1 tablet (500 mg total) by mouth every 12 (twelve) hours. for 7 days 14 tablet 9/6/2023 9/13/2023 Mary Kate Bobby MD    ondansetron (ZOFRAN) 4 MG tablet Take 1 tablet (4 mg total) by mouth every 8 (eight) hours as needed for Nausea. 10 tablet 9/6/2023 9/9/2023 Mary Kate Bobby MD          Follow-up Information       Follow up With Specialties Details Why Contact Info    Maria Esther Gonzalez FNP Family Medicine In 1 week If symptoms worsen [facility] [address] [facility]  [facility] [address]  [phone]      Jenelle Morales MD Obstetrics and Gynecology On 9/20/2023 If symptoms worsen [facility] [address] [place] [address]  [facility] [address]  [phone]               Mary Kate Bobby MD  09/06/23 0886

## 2023-09-07 LAB
CHLAMYDIA BY PCR: NEGATIVE
N. GONORRHOEAE (GC) BY PCR: NEGATIVE

## 2023-09-08 LAB — UA COMPLETE W REFLEX CULTURE PNL UR: NO GROWTH

## 2023-09-24 ENCOUNTER — OFFICE VISIT (OUTPATIENT)
Dept: FAMILY MEDICINE | Facility: CLINIC | Age: 23
End: 2023-09-24
Payer: MEDICAID

## 2023-09-24 VITALS
BODY MASS INDEX: 44.9 KG/M2 | TEMPERATURE: 99 F | WEIGHT: 279.38 LBS | DIASTOLIC BLOOD PRESSURE: 87 MMHG | OXYGEN SATURATION: 96 % | RESPIRATION RATE: 18 BRPM | HEIGHT: 66 IN | SYSTOLIC BLOOD PRESSURE: 133 MMHG | HEART RATE: 105 BPM

## 2023-09-24 DIAGNOSIS — J45.909 ASTHMA, UNSPECIFIED ASTHMA SEVERITY, UNSPECIFIED WHETHER COMPLICATED, UNSPECIFIED WHETHER PERSISTENT: Chronic | ICD-10-CM

## 2023-09-24 DIAGNOSIS — Z20.828 VIRAL DISEASE EXPOSURE: ICD-10-CM

## 2023-09-24 DIAGNOSIS — R06.02 SHORTNESS OF BREATH: ICD-10-CM

## 2023-09-24 DIAGNOSIS — J45.901 EXACERBATION OF ASTHMA, UNSPECIFIED ASTHMA SEVERITY, UNSPECIFIED WHETHER PERSISTENT: Primary | ICD-10-CM

## 2023-09-24 PROCEDURE — 99051 MED SERV EVE/WKEND/HOLIDAY: CPT | Mod: ,,, | Performed by: NURSE PRACTITIONER

## 2023-09-24 PROCEDURE — 99051 PR MEDICAL SERVICES, EVE/WKEND/HOLIDAY: ICD-10-PCS | Mod: ,,, | Performed by: NURSE PRACTITIONER

## 2023-09-24 PROCEDURE — 1159F MED LIST DOCD IN RCRD: CPT | Mod: CPTII,,, | Performed by: NURSE PRACTITIONER

## 2023-09-24 PROCEDURE — 1159F PR MEDICATION LIST DOCUMENTED IN MEDICAL RECORD: ICD-10-PCS | Mod: CPTII,,, | Performed by: NURSE PRACTITIONER

## 2023-09-24 PROCEDURE — 3075F SYST BP GE 130 - 139MM HG: CPT | Mod: CPTII,,, | Performed by: NURSE PRACTITIONER

## 2023-09-24 PROCEDURE — 3008F PR BODY MASS INDEX (BMI) DOCUMENTED: ICD-10-PCS | Mod: CPTII,,, | Performed by: NURSE PRACTITIONER

## 2023-09-24 PROCEDURE — 1160F RVW MEDS BY RX/DR IN RCRD: CPT | Mod: CPTII,,, | Performed by: NURSE PRACTITIONER

## 2023-09-24 PROCEDURE — 3079F PR MOST RECENT DIASTOLIC BLOOD PRESSURE 80-89 MM HG: ICD-10-PCS | Mod: CPTII,,, | Performed by: NURSE PRACTITIONER

## 2023-09-24 PROCEDURE — 3079F DIAST BP 80-89 MM HG: CPT | Mod: CPTII,,, | Performed by: NURSE PRACTITIONER

## 2023-09-24 PROCEDURE — 99213 PR OFFICE/OUTPT VISIT, EST, LEVL III, 20-29 MIN: ICD-10-PCS | Mod: 25,,, | Performed by: NURSE PRACTITIONER

## 2023-09-24 PROCEDURE — 96372 THER/PROPH/DIAG INJ SC/IM: CPT | Mod: 59,,, | Performed by: NURSE PRACTITIONER

## 2023-09-24 PROCEDURE — 3008F BODY MASS INDEX DOCD: CPT | Mod: CPTII,,, | Performed by: NURSE PRACTITIONER

## 2023-09-24 PROCEDURE — 94640 PR INHAL RX, AIRWAY OBST/DX SPUTUM INDUCT: ICD-10-PCS | Mod: ,,, | Performed by: NURSE PRACTITIONER

## 2023-09-24 PROCEDURE — 99213 OFFICE O/P EST LOW 20 MIN: CPT | Mod: 25,,, | Performed by: NURSE PRACTITIONER

## 2023-09-24 PROCEDURE — 94640 AIRWAY INHALATION TREATMENT: CPT | Mod: ,,, | Performed by: NURSE PRACTITIONER

## 2023-09-24 PROCEDURE — 3075F PR MOST RECENT SYSTOLIC BLOOD PRESS GE 130-139MM HG: ICD-10-PCS | Mod: CPTII,,, | Performed by: NURSE PRACTITIONER

## 2023-09-24 PROCEDURE — 87428 SARSCOV & INF VIR A&B AG IA: CPT | Mod: RHCUB | Performed by: NURSE PRACTITIONER

## 2023-09-24 PROCEDURE — 96372 PR INJECTION,THERAP/PROPH/DIAG2ST, IM OR SUBCUT: ICD-10-PCS | Mod: 59,,, | Performed by: NURSE PRACTITIONER

## 2023-09-24 PROCEDURE — 1160F PR REVIEW ALL MEDS BY PRESCRIBER/CLIN PHARMACIST DOCUMENTED: ICD-10-PCS | Mod: CPTII,,, | Performed by: NURSE PRACTITIONER

## 2023-09-24 RX ORDER — METHYLPREDNISOLONE 4 MG/1
TABLET ORAL
COMMUNITY
Start: 2023-08-09 | End: 2023-09-24 | Stop reason: ALTCHOICE

## 2023-09-24 RX ORDER — ALBUTEROL SULFATE 90 UG/1
1-2 AEROSOL, METERED RESPIRATORY (INHALATION) EVERY 6 HOURS PRN
Qty: 18 G | Refills: 2 | Status: SHIPPED | OUTPATIENT
Start: 2023-09-24 | End: 2023-10-22 | Stop reason: SDUPTHER

## 2023-09-24 RX ORDER — ALBUTEROL SULFATE 0.83 MG/ML
2.5 SOLUTION RESPIRATORY (INHALATION)
Status: COMPLETED | OUTPATIENT
Start: 2023-09-24 | End: 2023-09-24

## 2023-09-24 RX ORDER — ALBUTEROL SULFATE 1.25 MG/3ML
1.25 SOLUTION RESPIRATORY (INHALATION)
Status: DISCONTINUED | OUTPATIENT
Start: 2023-09-24 | End: 2023-09-24

## 2023-09-24 RX ORDER — DEXAMETHASONE SODIUM PHOSPHATE 4 MG/ML
4 INJECTION, SOLUTION INTRA-ARTICULAR; INTRALESIONAL; INTRAMUSCULAR; INTRAVENOUS; SOFT TISSUE
Status: COMPLETED | OUTPATIENT
Start: 2023-09-24 | End: 2023-09-24

## 2023-09-24 RX ORDER — METHYLPREDNISOLONE 4 MG/1
TABLET ORAL
Qty: 21 EACH | Refills: 0 | Status: SHIPPED | OUTPATIENT
Start: 2023-09-24 | End: 2023-10-15

## 2023-09-24 RX ORDER — METHYLPREDNISOLONE ACETATE 40 MG/ML
40 INJECTION, SUSPENSION INTRA-ARTICULAR; INTRALESIONAL; INTRAMUSCULAR; SOFT TISSUE
Status: COMPLETED | OUTPATIENT
Start: 2023-09-24 | End: 2023-09-24

## 2023-09-24 RX ADMIN — ALBUTEROL SULFATE 2.5 MG: 0.83 SOLUTION RESPIRATORY (INHALATION) at 01:09

## 2023-09-24 RX ADMIN — METHYLPREDNISOLONE ACETATE 40 MG: 40 INJECTION, SUSPENSION INTRA-ARTICULAR; INTRALESIONAL; INTRAMUSCULAR; SOFT TISSUE at 01:09

## 2023-09-24 RX ADMIN — DEXAMETHASONE SODIUM PHOSPHATE 4 MG: 4 INJECTION, SOLUTION INTRA-ARTICULAR; INTRALESIONAL; INTRAMUSCULAR; INTRAVENOUS; SOFT TISSUE at 01:09

## 2023-09-24 NOTE — PROGRESS NOTES
YANN Montesinos   [place] [facility] [facility] - [facility] - [facility]  [address] [place] [address]  [phone]      PATIENT NAME: Tulio Chance  : 2000  DATE: 23  MRN: [id number]      Billing Provider: YANN Montesinos  Level of Service: CA OFFICE/OUTPT VISIT, EST, LEVL III, 20-29 MIN  Patient PCP Information       Provider PCP Type    YANN Milian General            Reason for Visit / Chief Complaint: Shortness of Breath (Onset Saturday. Hx of asthma. ), Cough (Onset Saturday. ), and Chest Pain (Tightness in chest, onset Saturday. Pt states that when she lays down or amputate she can barely breathe.  )       Update PCP  Update Chief Complaint         History of Present Illness / Problem Focused Workflow       Patient presents to clinic with the above listed complaints.     Review of Systems     Review of Systems   Constitutional:  Negative for chills, diaphoresis, fatigue and fever.   HENT:  Negative for congestion, ear pain, facial swelling and trouble swallowing.    Eyes:  Negative for pain, discharge, redness, itching and visual disturbance.   Respiratory:  Positive for cough, chest tightness and shortness of breath. Negative for apnea and wheezing.    Cardiovascular:  Negative for chest pain, palpitations and leg swelling.   Gastrointestinal:  Negative for abdominal pain, constipation, diarrhea, nausea and vomiting.   Genitourinary:  Negative for dysuria.   Skin:  Negative for rash.   Neurological:  Negative for dizziness and headaches.     Medical / Social / Family History     Past Medical History:   Diagnosis Date    Appendicitis     Asthma 2022    Breast lump     Breast mass        Past Surgical History:   Procedure Laterality Date    APPENDECTOMY      BREAST BIOPSY Right 5/3/2021    Procedure: BIOPSY, BREAST, WITH LUMPECTOMY;  Surgeon: Eva Farmer MD;  [facility];  Service: General;   Laterality: Right;    TONSILLECTOMY Bilateral 11/19/2021    Procedure: TONSILLECTOMY;  Surgeon: Heber Tatum MD;  Location: [facility];  Service: ENT;  Laterality: Bilateral;       Social History  Ms.  reports that she has quit smoking. Her smoking use included vaping with nicotine. She has never used smokeless tobacco. She reports that she does not currently use alcohol. She reports that she does not use drugs.    Family History  Ms.'s family history includes Asthma in her brother; Diabetes in her maternal grandmother; Heart disease in her father and maternal grandfather; Hypertension in her father, maternal grandfather, maternal grandmother, and mother.    Medications and Allergies     Medications  Outpatient Medications Marked as Taking for the 9/24/23 encounter (Office Visit) with Flores Lester FNP   Medication Sig Dispense Refill    [DISCONTINUED] albuterol (PROVENTIL/VENTOLIN HFA) 90 mcg/actuation inhaler Inhale 1-2 puffs into the lungs every 6 (six) hours as needed for Wheezing. Rescue 18 g 2     Current Facility-Administered Medications for the 9/24/23 encounter (Office Visit) with Flores Lester FNP   Medication Dose Route Frequency Provider Last Rate Last Admin    albuterol nebulizer solution 1.25 mg  1.25 mg Nebulization 1 time in Clinic/HOD Flores Lester FNP        dexAMETHasone injection 4 mg  4 mg Intramuscular 1 time in Clinic/[facility] Flores Lester FNP        methylPREDNISolone acetate injection 40 mg  40 mg Intramuscular 1 time in Clinic/HOD Flores Lester FNP           Allergies  Review of patient's allergies indicates:  No Known Allergies    Physical Examination     Vitals:    09/24/23 1306   BP: 133/87   Pulse: 105   Resp: 18   Temp: 99 °F (37.2 °C)     Physical Exam  Vitals and nursing note reviewed.   Constitutional:       General: She is awake.      Appearance: Normal appearance.   HENT:      Head: Normocephalic.      Right Ear: Tympanic membrane, ear canal and external  ear normal.      Left Ear: Tympanic membrane, ear canal and external ear normal.      Nose: Nose normal.      Mouth/Throat:      Lips: Pink.      Mouth: Mucous membranes are moist.      Pharynx: Oropharynx is clear.   Eyes:      General: Lids are normal.      Extraocular Movements: Extraocular movements intact.      Conjunctiva/sclera: Conjunctivae normal.      Pupils: Pupils are equal, round, and reactive to light.   Cardiovascular:      Rate and Rhythm: Normal rate and regular rhythm.      Pulses: Normal pulses.      Heart sounds: Normal heart sounds. No murmur heard.  Pulmonary:      Effort: Pulmonary effort is normal.      Breath sounds: Normal breath sounds. No decreased breath sounds, wheezing, rhonchi or rales.   Musculoskeletal:      Right lower leg: No edema.      Left lower leg: No edema.   Skin:     General: Skin is warm.      Coloration: Skin is not jaundiced.      Findings: No rash.   Neurological:      Mental Status: She is alert. Mental status is at baseline.   Psychiatric:         Mood and Affect: Mood normal.         Behavior: Behavior normal. Behavior is cooperative.     Assessment and Plan (including Health Maintenance)      Problem List  [employer]  Document Outside HM   :    Health Maintenance Due   Topic Date Due    Pap Smear  Never done    Influenza Vaccine (1) 09/01/2023       Problem List Items Addressed This Visit          Pulmonary    Asthma    Relevant Medications    albuterol (PROVENTIL/VENTOLIN HFA) 90 mcg/actuation inhaler    dexAMETHasone injection 4 mg (Start on 9/24/2023  1:30 PM)    methylPREDNISolone acetate injection 40 mg (Start on 9/24/2023  1:30 PM)    methylPREDNISolone (MEDROL DOSEPACK) 4 mg tablet     Other Visit Diagnoses       Viral disease exposure    -  Primary    Relevant Orders    POCT SARS-COV2 (COVID) with Flu Antigen    Shortness of breath        Relevant Medications    albuterol nebulizer solution 1.25 mg    dexAMETHasone injection 4 mg (Start on 9/24/2023  1:30  PM)    methylPREDNISolone acetate injection 40 mg (Start on 9/24/2023  1:30 PM)    methylPREDNISolone (MEDROL DOSEPACK) 4 mg tablet            Health Maintenance Topics with due status: Not Due       Topic Last Completion Date    TETANUS VACCINE 07/16/2014    Chlamydia Screening 09/06/2023       No future appointments.       Signature:  YANN Montesinos [facility] [address] [address]  [phone]    Date of encounter: 9/24/23

## 2023-10-09 ENCOUNTER — CLINICAL SUPPORT (OUTPATIENT)
Dept: FAMILY MEDICINE | Facility: CLINIC | Age: 23
End: 2023-10-09
Payer: MEDICAID

## 2023-10-09 DIAGNOSIS — Z23 NEED FOR VACCINATION: Primary | ICD-10-CM

## 2023-10-09 PROCEDURE — 90686 IIV4 VACC NO PRSV 0.5 ML IM: CPT | Mod: ,,, | Performed by: NURSE PRACTITIONER

## 2023-10-09 PROCEDURE — 90471 IMMUNIZATION ADMIN: CPT | Mod: ,,, | Performed by: NURSE PRACTITIONER

## 2023-10-09 PROCEDURE — 90686 FLU VACCINE (QUAD) GREATER THAN OR EQUAL TO 3YO PRESERVATIVE FREE IM: ICD-10-PCS | Mod: ,,, | Performed by: NURSE PRACTITIONER

## 2023-10-09 PROCEDURE — 90471 FLU VACCINE (QUAD) GREATER THAN OR EQUAL TO 3YO PRESERVATIVE FREE IM: ICD-10-PCS | Mod: ,,, | Performed by: NURSE PRACTITIONER

## 2023-10-22 ENCOUNTER — HOSPITAL ENCOUNTER (EMERGENCY)
Facility: HOSPITAL | Age: 23
Discharge: HOME | End: 2023-10-22
Attending: EMERGENCY MEDICINE
Payer: MEDICAID

## 2023-10-22 VITALS
HEART RATE: 88 BPM | RESPIRATION RATE: 19 BRPM | DIASTOLIC BLOOD PRESSURE: 81 MMHG | HEIGHT: 66 IN | BODY MASS INDEX: 42.75 KG/M2 | OXYGEN SATURATION: 99 % | WEIGHT: 266 LBS | SYSTOLIC BLOOD PRESSURE: 152 MMHG | TEMPERATURE: 99 F

## 2023-10-22 DIAGNOSIS — T22.212A PARTIAL THICKNESS BURN OF LEFT FOREARM, INITIAL ENCOUNTER: ICD-10-CM

## 2023-10-22 DIAGNOSIS — J45.909 ASTHMA, UNSPECIFIED ASTHMA SEVERITY, UNSPECIFIED WHETHER COMPLICATED, UNSPECIFIED WHETHER PERSISTENT: Primary | Chronic | ICD-10-CM

## 2023-10-22 PROCEDURE — 63600175 PHARM REV CODE 636 W HCPCS: Performed by: EMERGENCY MEDICINE

## 2023-10-22 PROCEDURE — 99284 EMERGENCY DEPT VISIT MOD MDM: CPT

## 2023-10-22 PROCEDURE — 99284 PR EMERGENCY DEPT VISIT,LEVEL IV: ICD-10-PCS | Mod: ,,, | Performed by: EMERGENCY MEDICINE

## 2023-10-22 PROCEDURE — 25000003 PHARM REV CODE 250: Performed by: EMERGENCY MEDICINE

## 2023-10-22 PROCEDURE — 96372 THER/PROPH/DIAG INJ SC/IM: CPT | Performed by: EMERGENCY MEDICINE

## 2023-10-22 PROCEDURE — 99284 EMERGENCY DEPT VISIT MOD MDM: CPT | Mod: ,,, | Performed by: EMERGENCY MEDICINE

## 2023-10-22 RX ORDER — SILVER SULFADIAZINE 10 G/1000G
CREAM TOPICAL 2 TIMES DAILY
Qty: 30 G | Refills: 0 | Status: SHIPPED | OUTPATIENT
Start: 2023-10-22 | End: 2024-02-05

## 2023-10-22 RX ORDER — ALBUTEROL SULFATE 90 UG/1
1-2 AEROSOL, METERED RESPIRATORY (INHALATION) EVERY 6 HOURS PRN
Qty: 18 G | Refills: 2 | Status: SHIPPED | OUTPATIENT
Start: 2023-10-22 | End: 2024-02-05 | Stop reason: SDUPTHER

## 2023-10-22 RX ORDER — HYDROCODONE BITARTRATE AND ACETAMINOPHEN 5; 325 MG/1; MG/1
1 TABLET ORAL EVERY 6 HOURS PRN
Qty: 6 TABLET | Refills: 0 | Status: SHIPPED | OUTPATIENT
Start: 2023-10-22 | End: 2024-02-05

## 2023-10-22 RX ORDER — SILVER SULFADIAZINE 10 G/1000G
1 CREAM TOPICAL
Status: COMPLETED | OUTPATIENT
Start: 2023-10-22 | End: 2023-10-22

## 2023-10-22 RX ORDER — KETOROLAC TROMETHAMINE 30 MG/ML
60 INJECTION, SOLUTION INTRAMUSCULAR; INTRAVENOUS
Status: COMPLETED | OUTPATIENT
Start: 2023-10-22 | End: 2023-10-22

## 2023-10-22 RX ADMIN — KETOROLAC TROMETHAMINE 60 MG: 30 INJECTION, SOLUTION INTRAMUSCULAR at 02:10

## 2023-10-22 RX ADMIN — SILVER SULFADIAZINE 1 TUBE: 10 CREAM TOPICAL at 02:10
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
## 2023-11-14 ENCOUNTER — PATIENT MESSAGE (OUTPATIENT)
Dept: ADMINISTRATIVE | Facility: HOSPITAL | Age: 23
End: 2023-11-14

## 2023-11-15 ENCOUNTER — PATIENT OUTREACH (OUTPATIENT)
Dept: ADMINISTRATIVE | Facility: HOSPITAL | Age: 23
End: 2023-11-15

## 2023-11-15 DIAGNOSIS — Z12.4 CERVICAL CANCER SCREENING: Primary | ICD-10-CM

## 2023-11-15 NOTE — PROGRESS NOTES
Population Health Chart Review & Patient Outreach Details    Health Maintenance Topics Addressed and Outreach Outcomes / Actions Taken:        Cervical Cancer Screening [x] Responded to Campaign Outreach to be scheduled for a Pap. Order placed.

## 2023-12-21 ENCOUNTER — PATIENT MESSAGE (OUTPATIENT)
Dept: ADMINISTRATIVE | Facility: OTHER | Age: 23
End: 2023-12-21

## 2024-01-26 ENCOUNTER — HOSPITAL ENCOUNTER (EMERGENCY)
Facility: HOSPITAL | Age: 24
Discharge: HOME | End: 2024-01-26
Payer: MEDICAID

## 2024-01-26 VITALS
HEIGHT: 66 IN | DIASTOLIC BLOOD PRESSURE: 74 MMHG | WEIGHT: 280 LBS | OXYGEN SATURATION: 97 % | RESPIRATION RATE: 18 BRPM | HEART RATE: 100 BPM | BODY MASS INDEX: 45 KG/M2 | SYSTOLIC BLOOD PRESSURE: 142 MMHG | TEMPERATURE: 99 F

## 2024-01-26 DIAGNOSIS — J06.9 VIRAL URI WITH COUGH: Primary | ICD-10-CM

## 2024-01-26 LAB
FLUAV AG UPPER RESP QL IA.RAPID: NEGATIVE
FLUBV AG UPPER RESP QL IA.RAPID: NEGATIVE
RAPID GROUP A STREP: NEGATIVE
SARS-COV-2 RDRP RESP QL NAA+PROBE: NEGATIVE

## 2024-01-26 PROCEDURE — 87635 SARS-COV-2 COVID-19 AMP PRB: CPT | Performed by: NURSE PRACTITIONER

## 2024-01-26 PROCEDURE — 87804 INFLUENZA ASSAY W/OPTIC: CPT | Performed by: NURSE PRACTITIONER

## 2024-01-26 PROCEDURE — 99284 EMERGENCY DEPT VISIT MOD MDM: CPT | Mod: ,,, | Performed by: NURSE PRACTITIONER

## 2024-01-26 PROCEDURE — 87880 STREP A ASSAY W/OPTIC: CPT | Performed by: NURSE PRACTITIONER

## 2024-01-26 PROCEDURE — 99284 EMERGENCY DEPT VISIT MOD MDM: CPT

## 2024-01-26 RX ORDER — PREDNISONE 20 MG/1
40 TABLET ORAL DAILY
Qty: 6 TABLET | Refills: 0 | Status: SHIPPED | OUTPATIENT
Start: 2024-01-26 | End: 2024-01-29

## 2024-01-26 RX ORDER — AZITHROMYCIN 250 MG/1
TABLET, FILM COATED ORAL
Qty: 6 TABLET | Refills: 0 | Status: SHIPPED | OUTPATIENT
Start: 2024-01-26 | End: 2024-01-31

## 2024-01-26 NOTE — Clinical Note
"Tulio Hopedaniella" Robson was seen and treated in our emergency department on 1/26/2024.  She may return to work on 01/26/2024.       If you have any questions or concerns, please don't hesitate to call.      Walter Mcgovern, FNP"

## 2024-01-26 NOTE — ED PROVIDER NOTES
Encounter Date: 1/26/2024       History     Chief Complaint   Patient presents with    Chills    Sore Throat    Cough    sinus congestion     Patient states she has had symptoms for 2 days.  Has not taken anything for pain, fever, cough...     Cough and wheezing x 1 day.  No fever    The history is provided by the patient.     Review of patient's allergies indicates:  No Known Allergies  Past Medical History:   Diagnosis Date    Appendicitis     Asthma 12/22/2022    Breast lump     Breast mass      Past Surgical History:   Procedure Laterality Date    APPENDECTOMY      BREAST BIOPSY Right 5/3/2021    Procedure: BIOPSY, BREAST, WITH LUMPECTOMY;  Surgeon: Eva Farmer MD;  Location: [facility] OR;  Service: General;  Laterality: Right;    TONSILLECTOMY Bilateral 11/19/2021    Procedure: TONSILLECTOMY;  Surgeon: Heber Tatum MD;  Location: [facility] OR;  Service: ENT;  Laterality: Bilateral;     Family History   Problem Relation Age of Onset    Hypertension Mother     Heart disease Father     Hypertension Father     Asthma Brother     Diabetes Maternal Grandmother     Hypertension Maternal Grandmother     Heart disease Maternal Grandfather     Hypertension Maternal Grandfather      Social History     Tobacco Use    Smoking status: Some Days     Types: Vaping with nicotine    Smokeless tobacco: Never   Substance Use Topics    Alcohol use: Not Currently    Drug use: Never     Review of Systems   HENT:  Positive for congestion.    Respiratory:  Positive for cough and wheezing.    All other systems reviewed and are negative.      Physical Exam     Initial Vitals [01/26/24 0612]   BP Pulse Resp Temp SpO2   (!) 142/74 100 18 98.6 °F (37 °C) 97 %      MAP       --         Physical Exam    Constitutional: She appears well-developed and well-nourished.   HENT:   Head: Normocephalic and atraumatic.   Right Ear: External ear normal.   Left Ear: External ear normal.   Mouth/Throat: Oropharynx is clear and moist.   Mild nasal  congestion   Eyes: Pupils are equal, round, and reactive to light.   Neck:   Normal range of motion.  Cardiovascular:  Normal rate, regular rhythm and normal heart sounds.           Pulmonary/Chest: Breath sounds normal.   Abdominal: Abdomen is soft.   Musculoskeletal:      Cervical back: Normal range of motion.     Neurological: She is alert and oriented to person, place, and time.   Skin: Skin is warm.   Psychiatric: She has a normal mood and affect.         Medical Screening Exam   See Full Note    ED Course   Procedures  Labs Reviewed   RAPID INFLUENZA A/B - Normal   THROAT SCREEN, RAPID STREP - Normal   SARS-COV-2 RNA AMPLIFICATION, QUAL - Normal    Narrative:     Negative SARS-CoV results should not be used as the sole basis for treatment or patient management decisions; negative results should be considered in the context of a patient's recent exposures, history and the presene of clinical signs and symptoms consistent with COVID-19.  Negative results should be treated as presumptive and confirmed by molecular assay, if necessary for patient management.   CULTURE, STREP A,  THROAT          Imaging Results    None          Medications - No data to display  Medical Decision Making  Cough and wheezing x 1 day.  No fever    Amount and/or Complexity of Data Reviewed  Labs: ordered.    Risk  Prescription drug management.                                      Clinical Impression:   Final diagnoses:  [J06.9] Viral URI with cough (Primary)        ED Disposition Condition    Discharge Stable          ED Prescriptions       Medication Sig Dispense Start Date End Date Auth. Provider    azithromycin (Z-DONNA) 250 MG tablet Take 2 tablets by mouth on day 1; Take 1 tablet by mouth on days 2-5 6 tablet 1/26/2024 1/31/2024 Walter Mcgovern FNP    predniSONE (DELTASONE) 20 MG tablet Take 2 tablets (40 mg total) by mouth once daily. for 3 days 6 tablet 1/26/2024 1/29/2024 Walter Mcgovern FNP          Follow-up Information        Follow up With Specialties Details Why Contact Info    [facility], [facility]  As needed [address] [facility] [address] [phone]               Walter Mcgovern, YANN  01/26/24 0707

## 2024-02-05 ENCOUNTER — OFFICE VISIT (OUTPATIENT)
Dept: FAMILY MEDICINE | Facility: CLINIC | Age: 24
End: 2024-02-05
Payer: MEDICAID

## 2024-02-05 ENCOUNTER — APPOINTMENT (OUTPATIENT)
Dept: RADIOLOGY | Facility: CLINIC | Age: 24
End: 2024-02-05
Attending: FAMILY MEDICINE
Payer: MEDICAID

## 2024-02-05 VITALS
RESPIRATION RATE: 20 BRPM | WEIGHT: 278 LBS | SYSTOLIC BLOOD PRESSURE: 122 MMHG | HEART RATE: 77 BPM | DIASTOLIC BLOOD PRESSURE: 75 MMHG | BODY MASS INDEX: 44.68 KG/M2 | OXYGEN SATURATION: 98 % | HEIGHT: 66 IN

## 2024-02-05 DIAGNOSIS — M54.50 ACUTE BILATERAL LOW BACK PAIN WITHOUT SCIATICA: Primary | ICD-10-CM

## 2024-02-05 DIAGNOSIS — J45.909 ASTHMA, UNSPECIFIED ASTHMA SEVERITY, UNSPECIFIED WHETHER COMPLICATED, UNSPECIFIED WHETHER PERSISTENT: Chronic | ICD-10-CM

## 2024-02-05 DIAGNOSIS — M54.50 ACUTE BILATERAL LOW BACK PAIN WITHOUT SCIATICA: ICD-10-CM

## 2024-02-05 PROCEDURE — 3074F SYST BP LT 130 MM HG: CPT | Mod: CPTII,,, | Performed by: FAMILY MEDICINE

## 2024-02-05 PROCEDURE — 99213 OFFICE O/P EST LOW 20 MIN: CPT | Mod: ,,, | Performed by: FAMILY MEDICINE

## 2024-02-05 PROCEDURE — 1159F MED LIST DOCD IN RCRD: CPT | Mod: CPTII,,, | Performed by: FAMILY MEDICINE

## 2024-02-05 PROCEDURE — 3008F BODY MASS INDEX DOCD: CPT | Mod: CPTII,,, | Performed by: FAMILY MEDICINE

## 2024-02-05 PROCEDURE — 3078F DIAST BP <80 MM HG: CPT | Mod: CPTII,,, | Performed by: FAMILY MEDICINE

## 2024-02-05 PROCEDURE — 72100 X-RAY EXAM L-S SPINE 2/3 VWS: CPT | Mod: 26,,, | Performed by: RADIOLOGY

## 2024-02-05 PROCEDURE — 72100 X-RAY EXAM L-S SPINE 2/3 VWS: CPT | Mod: TC,RHCUB | Performed by: FAMILY MEDICINE

## 2024-02-05 RX ORDER — ALBUTEROL SULFATE 90 UG/1
1-2 AEROSOL, METERED RESPIRATORY (INHALATION) EVERY 6 HOURS PRN
Qty: 18 G | Refills: 2 | Status: SHIPPED | OUTPATIENT
Start: 2024-02-05 | End: 2025-02-04

## 2024-02-05 NOTE — PROGRESS NOTES
Eric Lee MD        PATIENT NAME: Tulio Chance  : 2000  DATE: 24  MRN: [id number]      Billing Provider: Eric Lee MD  Level of Service: [place] OFFICE/OUTPT VISIT, EST, LEVL III, 20-29 MIN  Patient PCP Information       Provider PCP Type    [facility]            Reason for Visit / Chief Complaint: Back Pain (Back is hurting after lifting)       Update PCP  Update Chief Complaint         History of Present Illness / Problem Focused Workflow     Tulio Chance presents to the clinic with Back Pain (Back is hurting after lifting)     Recent on set low back pain after lifitng heavy pt.    Back Pain  Pertinent negatives include no abdominal pain, chest pain, fever, headaches or weakness (global weakness).       Review of Systems     Review of Systems   Constitutional:  Negative for activity change, appetite change, fever and unexpected weight change.   HENT:  Negative for congestion, rhinorrhea, sinus pressure, sinus pain, sore throat and trouble swallowing.    Eyes:  Negative for photophobia, pain, discharge and visual disturbance.   Respiratory:  Negative for cough, chest tightness, wheezing and stridor.    Cardiovascular:  Negative for chest pain, palpitations and leg swelling.   Gastrointestinal:  Negative for abdominal pain, blood in stool, constipation, diarrhea and nausea.   Endocrine: Negative for polydipsia, polyphagia and polyuria.   Genitourinary:  Negative for difficulty urinating, flank pain and hematuria.   Musculoskeletal:  Positive for back pain. Negative for arthralgias and neck pain.   Skin:  Negative for rash.   Allergic/Immunologic: Negative for food allergies.   Neurological:  Negative for dizziness, tremors, seizures, syncope, weakness (global weakness) and headaches.   Psychiatric/Behavioral:  Negative for behavioral problems, confusion, decreased concentration, dysphoric mood and hallucinations. The patient is not nervous/anxious.         Medical /  Social / Family History     Past Medical History:   Diagnosis Date    Appendicitis     Asthma 12/22/2022    Breast lump     Breast mass        Past Surgical History:   Procedure Laterality Date    APPENDECTOMY      BREAST BIOPSY Right 5/3/2021    Procedure: BIOPSY, BREAST, WITH LUMPECTOMY;  Surgeon: Eva Farmer MD;  Location: [facility];  Service: General;  Laterality: Right;    TONSILLECTOMY Bilateral 11/19/2021    Procedure: TONSILLECTOMY;  Surgeon: Heber Tatum MD;  Location: [facility];  Service: ENT;  Laterality: Bilateral;       Social History  Ms.  reports that she has been smoking vaping with nicotine. She has never used smokeless tobacco. She reports that she does not currently use alcohol. She reports that she does not use drugs.    Family History  Ms.'s family history includes Asthma in her brother; Diabetes in her maternal grandmother; Heart disease in her father and maternal grandfather; Hypertension in her father, maternal grandfather, maternal grandmother, and mother.    Medications and Allergies     Medications  No outpatient medications have been marked as taking for the 2/5/24 encounter (Office Visit) with Eric Lee MD.       Allergies  Review of patient's allergies indicates:  No Known Allergies    Physical Examination     Vitals:    02/05/24 1024   BP: 122/75   Pulse: 77   Resp: 20     Physical Exam  Constitutional:       General: She is not in acute distress.     Appearance: Normal appearance.   HENT:      Head: Normocephalic.      Right Ear: Tympanic membrane and ear canal normal.      Left Ear: Tympanic membrane and ear canal normal.      Nose: Nose normal.      Mouth/Throat:      Mouth: Mucous membranes are moist.      Pharynx: No oropharyngeal exudate.   Eyes:      Extraocular Movements: Extraocular movements intact.      Pupils: Pupils are equal, round, and reactive to light.   Cardiovascular:      Rate and Rhythm: Normal rate and regular rhythm.      Heart sounds: No  murmur heard.  Pulmonary:      Effort: Pulmonary effort is normal.      Breath sounds: Normal breath sounds. No wheezing.   Abdominal:      General: Abdomen is flat. Bowel sounds are normal.      Palpations: Abdomen is soft.      Hernia: No hernia is present.   Musculoskeletal:         General: Normal range of motion.      Cervical back: Normal range of motion and neck supple.      Right lower leg: No edema.      Left lower leg: No edema.   Lymphadenopathy:      Cervical: No cervical adenopathy.   Skin:     General: Skin is warm and dry.      Coloration: Skin is not jaundiced.      Findings: No lesion.   Neurological:      General: No focal deficit present.      Mental Status: She is alert and oriented to person, place, and time.      Cranial Nerves: No cranial nerve deficit.      Gait: Gait normal.   Psychiatric:         Mood and Affect: Mood normal.         Behavior: Behavior normal.         Judgment: Judgment normal.          Assessment and Plan (including Health Maintenance)      Problem List  Smart Sets  Document Outside HM   :    Plan:     1. Acute bilateral low back pain without sciatica    -     X-Ray Lumbar Spine AP And Lateral; Future; Expected date: 02/05/2024    2. Asthma, unspecified asthma severity, unspecified whether complicated, unspecified whether persistent  The current medical regimen is effective;  continue present plan and medications.  -     albuterol (PROVENTIL/VENTOLIN HFA) 90 mcg/actuation inhaler; Inhale 1-2 puffs into the lungs every 6 (six) hours as needed for Wheezing. Rescue  Dispense: 18 g; Refill: 2          Health Maintenance Due   Topic Date Due    Pneumococcal Vaccines (Age 0-64) (1 of 2 - PCV) Never done    Pap Smear  Never done    COVID-19 Vaccine (3 - 2023-24 season) 09/01/2023       1. Acute bilateral low back pain without sciatica  -     X-Ray Lumbar Spine AP And Lateral; Future; Expected date: 02/05/2024    2. Asthma, unspecified asthma severity, unspecified whether  complicated, unspecified whether persistent  -     albuterol (PROVENTIL/VENTOLIN HFA) 90 mcg/actuation inhaler; Inhale 1-2 puffs into the lungs every 6 (six) hours as needed for Wheezing. Rescue  Dispense: 18 g; Refill: 2         Health Maintenance Topics with due status: Not Due       Topic Last Completion Date    TETANUS VACCINE 07/16/2014    Chlamydia Screening 09/06/2023       No future appointments.     There are no Patient Instructions on file for this visit.  Follow up if symptoms worsen or fail to improve.     Signature:  Eric Lee MD      Date of encounter: 2/5/24

## 2024-04-01 ENCOUNTER — HOSPITAL ENCOUNTER (EMERGENCY)
Facility: HOSPITAL | Age: 24
Discharge: HOME | End: 2024-04-01

## 2024-04-01 VITALS
OXYGEN SATURATION: 97 % | DIASTOLIC BLOOD PRESSURE: 76 MMHG | HEIGHT: 66 IN | BODY MASS INDEX: 43.87 KG/M2 | HEART RATE: 96 BPM | RESPIRATION RATE: 18 BRPM | TEMPERATURE: 99 F | WEIGHT: 273 LBS | SYSTOLIC BLOOD PRESSURE: 149 MMHG

## 2024-04-01 DIAGNOSIS — S39.012A LUMBAR STRAIN, INITIAL ENCOUNTER: Primary | ICD-10-CM

## 2024-04-01 PROCEDURE — 96372 THER/PROPH/DIAG INJ SC/IM: CPT | Performed by: NURSE PRACTITIONER

## 2024-04-01 PROCEDURE — 99284 EMERGENCY DEPT VISIT MOD MDM: CPT | Mod: ,,, | Performed by: NURSE PRACTITIONER

## 2024-04-01 PROCEDURE — 63600175 PHARM REV CODE 636 W HCPCS: Performed by: NURSE PRACTITIONER

## 2024-04-01 PROCEDURE — 99284 EMERGENCY DEPT VISIT MOD MDM: CPT | Mod: 25

## 2024-04-01 RX ORDER — KETOROLAC TROMETHAMINE 30 MG/ML
30 INJECTION, SOLUTION INTRAMUSCULAR; INTRAVENOUS
Status: COMPLETED | OUTPATIENT
Start: 2024-04-01 | End: 2024-04-01

## 2024-04-01 RX ORDER — METHOCARBAMOL 500 MG/1
500 TABLET, FILM COATED ORAL 4 TIMES DAILY PRN
Qty: 40 TABLET | Refills: 0 | Status: SHIPPED | OUTPATIENT
Start: 2024-04-01 | End: 2024-04-11

## 2024-04-01 RX ORDER — METHYLPREDNISOLONE 4 MG/1
TABLET ORAL
Qty: 21 EACH | Refills: 0 | Status: SHIPPED | OUTPATIENT
Start: 2024-04-01 | End: 2024-04-22

## 2024-04-01 RX ADMIN — KETOROLAC TROMETHAMINE 30 MG: 30 INJECTION, SOLUTION INTRAMUSCULAR; INTRAVENOUS at 07:04

## 2024-04-02 NOTE — ED TRIAGE NOTES
Pt presents to ED with c/o low back pain. Pt states she has been primary CG for her grandmother who is obese since December. Pt reports seeing PCP and was prescribed advil which has not helped. Pt has also been taking ibuprofen 800mg.

## 2024-04-02 NOTE — ED PROVIDER NOTES
Encounter Date: 4/1/2024       History     Chief Complaint   Patient presents with    Back Pain     Tulio Chance is a 23 y.o. Black or  /female presenting to ED with low back pain for 2 months. Denies injury but notes that she does help take move of her obese grandmother. She has seen PCP 2 mo ago for sx and had negative XR and was told to take Aleve. She reports no improvement in sx. Has not f/u with PCP since that time. Denies  sx, bowel or bladder incontinence or saddle anesthesia. Currently in NAD. VSS at this time.      The history is provided by the patient.     Review of patient's allergies indicates:  No Known Allergies  Past Medical History:   Diagnosis Date    Appendicitis     Asthma 12/22/2022    Breast lump     Breast mass      Past Surgical History:   Procedure Laterality Date    APPENDECTOMY      BREAST BIOPSY Right 5/3/2021    Procedure: BIOPSY, BREAST, WITH LUMPECTOMY;  Surgeon: Eva Farmer MD;  Location: [facility] OR;  Service: General;  Laterality: Right;    TONSILLECTOMY Bilateral 11/19/2021    Procedure: TONSILLECTOMY;  Surgeon: Heber Tatum MD;  Location: [facility] OR;  Service: ENT;  Laterality: Bilateral;     Family History   Problem Relation Age of Onset    Hypertension Mother     Heart disease Father     Hypertension Father     Asthma Brother     Diabetes Maternal Grandmother     Hypertension Maternal Grandmother     Heart disease Maternal Grandfather     Hypertension Maternal Grandfather      Social History     Tobacco Use    Smoking status: Some Days     Types: Vaping with nicotine    Smokeless tobacco: Never   Substance Use Topics    Alcohol use: Yes    Drug use: Never     Review of Systems   Constitutional:  Negative for activity change, chills, fatigue and fever.   Respiratory:  Negative for cough and shortness of breath.    Cardiovascular:  Negative for chest pain and palpitations.   Gastrointestinal:  Negative for abdominal pain, blood in stool, nausea  and vomiting.   Genitourinary:  Negative for difficulty urinating, dysuria, frequency, hematuria, menstrual problem, pelvic pain, urgency, vaginal bleeding, vaginal discharge and vaginal pain.   Musculoskeletal:  Positive for back pain and myalgias. Negative for gait problem, neck pain and neck stiffness.   Skin:  Negative for color change, rash and wound.   Neurological:  Negative for dizziness, weakness, light-headedness and numbness.   Psychiatric/Behavioral:  Negative for confusion. The patient is not nervous/anxious.    All other systems reviewed and are negative.      Physical Exam     Initial Vitals [04/01/24 1857]   BP Pulse Resp Temp SpO2   (!) 149/76 96 18 98.5 °F (36.9 °C) 97 %      MAP       --         Physical Exam    Nursing note and vitals reviewed.  Constitutional: She appears well-developed and well-nourished. She is not diaphoretic. No distress.   Cardiovascular:  Normal rate, regular rhythm, normal heart sounds and intact distal pulses.           Pulmonary/Chest: Breath sounds normal. No respiratory distress.   Abdominal: Abdomen is soft. Bowel sounds are normal. She exhibits no distension. There is no abdominal tenderness. There is no rebound and no guarding.   Musculoskeletal:         General: No edema. Normal range of motion.      Cervical back: Normal.      Thoracic back: Normal.      Lumbar back: Tenderness present. No swelling, edema, deformity, signs of trauma, spasms or bony tenderness. Normal range of motion. Negative right straight leg raise test and negative left straight leg raise test.        Back:      Neurological: She is alert and oriented to person, place, and time. She has normal strength. GCS score is 15. GCS eye subscore is 4. GCS verbal subscore is 5. GCS motor subscore is 6.   Skin: Skin is warm and dry. Capillary refill takes less than 2 seconds.         Medical Screening Exam   See Full Note    ED Course   Procedures  Labs Reviewed - No data to display       Imaging Results     None          Medications   ketorolac injection 30 mg (30 mg Intramuscular Given 4/1/24 1913)     Medical Decision Making  Not consistent with fracture, AAA, kidney stone, surgical abdomen, kidney infection, renal failure, pneumonia, paraspinal or epidural infection, cord compression or cauda equina, pyelonephritis or UTI, or other emergent cause.  Pain improved with Toradol in ED.  Data Reviewed/Counseling: I have reviwed the patient's vital signs, nursing notes, and other relevant tests/information. I had a detailed discussion regarding the historical points, exam findings supporting the discharge diagnosis. I also discussed the need for outpatient follow-up and the need to return to the ED if symptoms worsen or if there are any questions or concerns that arise at home. D/c script for Medrol dose pack, Robaxin    Dx: Strain of lower back without sciatica     Amount and/or Complexity of Data Reviewed  Independent Historian:      Details: Tulio Chance is a 23 y.o. Black or  /female presenting to ED with low back pain for 2 months. Denies injury but notes that she does help take move of her obese grandmother. She has seen PCP 2 mo ago for sx and had negative XR and was told to take Aleve. She reports no improvement in sx. Has not f/u with PCP since that time. Denies  sx, bowel or bladder incontinence or saddle anesthesia. Currently in NAD. VSS at this time.      Risk  Prescription drug management.                                      Clinical Impression:   Final diagnoses:  [S39.012A] Lumbar strain, initial encounter (Primary)        ED Disposition Condition    Discharge Stable          ED Prescriptions       Medication Sig Dispense Start Date End Date Auth. Provider    methylPREDNISolone (MEDROL DOSEPACK) 4 mg tablet use as directed 21 each 4/1/2024 4/22/2024 Greta Juares FNP    methocarbamoL (ROBAXIN) 500 MG Tab Take 1 tablet (500 mg total) by mouth 4 (four) times daily as needed (back  pain/spasms). 40 tablet 4/1/2024 4/11/2024 Greta Juares, YANN          Follow-up Information    None          Greta Juares, YANN  04/01/24 1920

## 2024-04-02 NOTE — DISCHARGE INSTRUCTIONS
Follow up with primary care provider in 2-3 days if no improvement.  No bending, pushing, pulling, straining or any activities that could cause more injury.  Rotate heat and ice to area 20 minutes every 2 hours when possible.  Rest as much as possible.  Tylenol 650 mg every 4-6 hours as needed for pain.  Motrin 600 mg every 8 hours as needed for pain.  Medrol dose pack until complete.  Robaxin 4 times daily as needed for muscle spasms.  Return to emergency department for any new or worsening symtpoms.

## 2024-06-21 ENCOUNTER — HOSPITAL ENCOUNTER (EMERGENCY)
Facility: HOSPITAL | Age: 24
Discharge: HOME | End: 2024-06-21

## 2024-06-21 VITALS
TEMPERATURE: 98 F | SYSTOLIC BLOOD PRESSURE: 120 MMHG | OXYGEN SATURATION: 99 % | DIASTOLIC BLOOD PRESSURE: 74 MMHG | BODY MASS INDEX: 45.64 KG/M2 | HEIGHT: 66 IN | HEART RATE: 83 BPM | WEIGHT: 284 LBS | RESPIRATION RATE: 18 BRPM

## 2024-06-21 DIAGNOSIS — K21.00 GASTROESOPHAGEAL REFLUX DISEASE WITH ESOPHAGITIS WITHOUT HEMORRHAGE: ICD-10-CM

## 2024-06-21 DIAGNOSIS — R12 HEARTBURN: Primary | ICD-10-CM

## 2024-06-21 DIAGNOSIS — R14.1 GAS PAIN: ICD-10-CM

## 2024-06-21 PROCEDURE — 99284 EMERGENCY DEPT VISIT MOD MDM: CPT | Mod: ,,,

## 2024-06-21 PROCEDURE — 25000003 PHARM REV CODE 250

## 2024-06-21 PROCEDURE — 99283 EMERGENCY DEPT VISIT LOW MDM: CPT

## 2024-06-21 RX ORDER — LIDOCAINE HYDROCHLORIDE 20 MG/ML
15 SOLUTION OROPHARYNGEAL ONCE
Status: COMPLETED | OUTPATIENT
Start: 2024-06-21 | End: 2024-06-21

## 2024-06-21 RX ORDER — ALUMINUM HYDROXIDE, MAGNESIUM HYDROXIDE, AND SIMETHICONE 1200; 120; 1200 MG/30ML; MG/30ML; MG/30ML
30 SUSPENSION ORAL ONCE
Status: COMPLETED | OUTPATIENT
Start: 2024-06-21 | End: 2024-06-21

## 2024-06-21 RX ADMIN — ALUMINUM HYDROXIDE, MAGNESIUM HYDROXIDE, AND DIMETHICONE 30 ML: 200; 20; 200 SUSPENSION ORAL at 01:06

## 2024-06-21 RX ADMIN — LIDOCAINE HYDROCHLORIDE 15 ML: 20 SOLUTION ORAL at 01:06

## 2024-06-21 NOTE — ED PROVIDER NOTES
Encounter Date: 6/21/2024       History     Chief Complaint   Patient presents with    Heartburn     23-year-old  or black female presents to ED complaining of nonradiating epigastric burning with belching since 17:30 today.  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  She reports her last normal bowel movement was today.  She denies fever, dizziness, chills, shortness and breath, cough, chest pain, nausea, vomiting, diarrhea, hematuria, or dysuria.  She has a history of GERD with not currently taking any medication for this or any other medical problem. PMH includes appendectomy, tonsillectomy, breast biopsy.  Vital signs are stable and patient in no acute distress with room air sat 99% or better.        The history is provided by the patient.     Review of patient's allergies indicates:  No Known Allergies  Past Medical History:   Diagnosis Date    Appendicitis     Asthma 12/22/2022    Breast lump     Breast mass      Past Surgical History:   Procedure Laterality Date    APPENDECTOMY      BREAST BIOPSY Right 5/3/2021    Procedure: BIOPSY, BREAST, WITH LUMPECTOMY;  Surgeon: Eva Farmer MD;  Location: [facility] OR;  Service: General;  Laterality: Right;    TONSILLECTOMY Bilateral 11/19/2021    Procedure: TONSILLECTOMY;  Surgeon: Heber Tatum MD;  Location: [facility] OR;  Service: ENT;  Laterality: Bilateral;     Family History   Problem Relation Name Age of Onset    Hypertension Mother      Heart disease Father      Hypertension Father      Asthma Brother      Diabetes Maternal Grandmother      Hypertension Maternal Grandmother      Heart disease Maternal Grandfather      Hypertension Maternal Grandfather       Social History     Tobacco Use    Smoking status: Some Days     Types: Vaping with nicotine    Smokeless tobacco: Never   Substance Use Topics    Alcohol use: Yes    Drug use: Never     Review of Systems    Constitutional:  Negative for activity change, appetite change, chills and fever.   HENT:  Negative for congestion, mouth sores, rhinorrhea, sneezing, sore throat, tinnitus and trouble swallowing.    Eyes:  Negative for visual disturbance.   Respiratory:  Negative for cough and shortness of breath.    Cardiovascular:  Negative for chest pain, palpitations and leg swelling.   Gastrointestinal:  Positive for abdominal pain (eoigastric burning with belching). Negative for constipation, diarrhea, nausea and vomiting.   Genitourinary:  Negative for flank pain, frequency and hematuria.   Musculoskeletal:  Negative for neck pain and neck stiffness.   Skin:  Negative for rash and wound.   Neurological:  Negative for seizures, syncope, weakness and headaches.   Psychiatric/Behavioral:  Negative for suicidal ideas.        Physical Exam     Initial Vitals [06/21/24 0109]   BP Pulse Resp Temp SpO2   120/74 83 18 98.3 °F (36.8 °C) 99 %      MAP       --         Physical Exam    Nursing note and vitals reviewed.  Constitutional: She appears well-developed and well-nourished. No distress.   Obese   HENT:   Head: Normocephalic.   Right Ear: External ear normal.   Left Ear: External ear normal.   Mouth/Throat: No oropharyngeal exudate.   Red oropharynx    Eyes: Conjunctivae and EOM are normal. Pupils are equal, round, and reactive to light.   Neck:   Normal range of motion.  Cardiovascular:  Normal rate, regular rhythm, normal heart sounds and intact distal pulses.           No murmur heard.  Pulmonary/Chest: Breath sounds normal. No respiratory distress. She has no wheezes.   Abdominal: Abdomen is soft. Bowel sounds are normal. She exhibits no distension. There is no abdominal tenderness.   Musculoskeletal:         General: Normal range of motion.      Cervical back: Normal range of motion.     Lymphadenopathy:     She has no cervical adenopathy.   Neurological: She is alert and oriented to person, place, and time. She has normal  strength. GCS score is 15. GCS eye subscore is 4. GCS verbal subscore is 5. GCS motor subscore is 6.   Skin: Skin is warm and dry. Capillary refill takes less than 2 seconds.   Psychiatric: She has a normal mood and affect.         Medical Screening Exam   See Full Note    ED Course   Procedures  Labs Reviewed - No data to display       Imaging Results    None          Medications   aluminum-magnesium hydroxide-simethicone 200-200-20 mg/5 mL suspension 30 mL (30 mLs Oral Given 6/21/24 0126)     And   LIDOcaine viscous HCl 2% oral solution 15 mL (15 mLs Oral Given 6/21/24 0126)     Medical Decision Making  At this time, I does not feel that radiology studies or lab will add any benefit to care or diagnostics since there is no reported injury, signs of trauma or signs/sx of infection. I feel that patient has reached maximum benefit from ED evaluation, treatment and observation. I thoroughly explained all findings to patient to the best of my ability and answered all questions to their satisfaction. I educated patient on the general/expected course of the condition and treatment options in ED and after discharge. I also informed patient that our evaluation in the emergency department today is an evaluation of the condition in one moment in time. If there is any worsening of the condition of if symptoms persist, the patient has been instructed to return to the ED immediately for further evaluation. Patient has also been advised to follow up with PCP in 2-3 days for re-evaluation. Patient verbalized understanding of the instructions and is agreeable to disposition. No questions or concerns at this time.     Dx:  GERD, heartburn, gas pain    Amount and/or Complexity of Data Reviewed  Independent Historian:      Details: 23-year-old  or black female presents to ED complaining of nonradiating epigastric burning with belching since 17:30 today.  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  She reports her last normal bowel movement was today.  She denies fever, dizziness, chills, shortness and breath, cough, chest pain, nausea, vomiting, diarrhea, hematuria, or dysuria.  She has a history of GERD with not currently taking any medication for this or any other medical problem. PMH includes appendectomy, tonsillectomy, breast biopsy.  Vital signs are stable and patient in no acute distress with room air sat 99% or better.                   ED Course as of 06/21/24 0157 Fri Jun 21, 2024   0153 Patient reports pain has been relieved since receiving the GI cocktail and continuing to belch while in the ER. [KT]      ED Course User Index  [KT] Kendal Potter NP                           Clinical Impression:   Final diagnoses:  [R12] Heartburn (Primary)  [K21.00] Gastroesophageal reflux disease with esophagitis without hemorrhage  [R14.1] Gas pain        ED Disposition Condition    Discharge Stable          ED Prescriptions    None       Follow-up Information    None          Kendal Potter NP  06/21/24 0157

## 2024-06-21 NOTE — ED NOTES
Pt states she no longer feels the burning in her chest. Also states she had a big burp and felt no burning.

## 2024-06-21 NOTE — Clinical Note
"Tulio Hopedaniella" Chance was seen and treated in our emergency department on 6/21/2024.  She may return to work on 06/21/2024.       If you have any questions or concerns, please don't hesitate to call.      Kendal Potter, JHON"

## 2024-06-21 NOTE — DISCHARGE INSTRUCTIONS
Follow up with primary care provider in the next 2-3 days for re-evaluation.   Avoid spicy or greasy foods, carbonated beverages.   Increase water intake.   May try Tums over-the-counter for immediate relief of heartburn as needed.  If problems persist with excessive bowel gas, may try over-the-counter Beano, Gaviscon, or Gas-X as needed.  Begin Prilosec daily for 2 weeks.  Please read the discharge instructions on reflux and gas/bloating that have been provided to you.  Return to emergency department for any new or worsening symptoms.

## 2024-06-21 NOTE — ED TRIAGE NOTES
Pt presents to ED with c/o burning in chest. Reports it started at approx 1730 yesterday. States she has not eaten much today but has been burping.

## 2024-06-24 ENCOUNTER — TELEPHONE (OUTPATIENT)
Dept: EMERGENCY MEDICINE | Facility: HOSPITAL | Age: 24
End: 2024-06-24

## 2024-07-18 ENCOUNTER — HOSPITAL ENCOUNTER (EMERGENCY)
Facility: HOSPITAL | Age: 24
Discharge: HOME | End: 2024-07-18
Attending: EMERGENCY MEDICINE
Payer: COMMERCIAL

## 2024-07-18 VITALS
DIASTOLIC BLOOD PRESSURE: 76 MMHG | SYSTOLIC BLOOD PRESSURE: 124 MMHG | BODY MASS INDEX: 43.71 KG/M2 | RESPIRATION RATE: 18 BRPM | HEART RATE: 99 BPM | TEMPERATURE: 99 F | WEIGHT: 272 LBS | HEIGHT: 66 IN | OXYGEN SATURATION: 99 %

## 2024-07-18 DIAGNOSIS — J02.0 STREP PHARYNGITIS: Primary | ICD-10-CM

## 2024-07-18 LAB
GROUP A STREP MOLECULAR (OHS): POSITIVE
INFLUENZA A MOLECULAR (OHS): NEGATIVE
INFLUENZA B MOLECULAR (OHS): NEGATIVE
SARS-COV-2 RDRP RESP QL NAA+PROBE: NEGATIVE

## 2024-07-18 PROCEDURE — 25000003 PHARM REV CODE 250: Performed by: EMERGENCY MEDICINE

## 2024-07-18 PROCEDURE — 87651 STREP A DNA AMP PROBE: CPT | Performed by: EMERGENCY MEDICINE

## 2024-07-18 PROCEDURE — 87635 SARS-COV-2 COVID-19 AMP PRB: CPT | Performed by: EMERGENCY MEDICINE

## 2024-07-18 PROCEDURE — 99284 EMERGENCY DEPT VISIT MOD MDM: CPT | Mod: 25

## 2024-07-18 PROCEDURE — 87502 INFLUENZA DNA AMP PROBE: CPT | Performed by: EMERGENCY MEDICINE

## 2024-07-18 PROCEDURE — 96372 THER/PROPH/DIAG INJ SC/IM: CPT | Performed by: EMERGENCY MEDICINE

## 2024-07-18 PROCEDURE — 99284 EMERGENCY DEPT VISIT MOD MDM: CPT | Mod: ,,, | Performed by: EMERGENCY MEDICINE

## 2024-07-18 PROCEDURE — 63600175 PHARM REV CODE 636 W HCPCS: Performed by: EMERGENCY MEDICINE

## 2024-07-18 RX ORDER — LIDOCAINE HYDROCHLORIDE 10 MG/ML
2.1 INJECTION INFILTRATION; PERINEURAL ONCE
Status: COMPLETED | OUTPATIENT
Start: 2024-07-18 | End: 2024-07-18

## 2024-07-18 RX ORDER — AMOXICILLIN 500 MG/1
500 TABLET, FILM COATED ORAL 3 TIMES DAILY
Qty: 30 TABLET | Refills: 0 | Status: SHIPPED | OUTPATIENT
Start: 2024-07-18 | End: 2024-07-28

## 2024-07-18 RX ORDER — CEFTRIAXONE 1 G/1
1 INJECTION, POWDER, FOR SOLUTION INTRAMUSCULAR; INTRAVENOUS ONCE
Status: COMPLETED | OUTPATIENT
Start: 2024-07-18 | End: 2024-07-18

## 2024-07-18 RX ADMIN — LIDOCAINE HYDROCHLORIDE 2.1 ML: 10 INJECTION, SOLUTION INFILTRATION; PERINEURAL at 01:07

## 2024-07-18 RX ADMIN — CEFTRIAXONE SODIUM 1 G: 1 INJECTION, POWDER, FOR SOLUTION INTRAMUSCULAR; INTRAVENOUS at 01:07

## 2024-07-18 NOTE — Clinical Note
"Tulio Hopedaniella" Robson was seen and treated in our emergency department on 7/18/2024.  She may return to work on 07/21/2024.       If you have any questions or concerns, please don't hesitate to call.      Mary Kate Bobby MD"

## 2024-07-18 NOTE — DISCHARGE INSTRUCTIONS
INCREASE REST AND FLUIDS  SOFT DIET  MEDICATIONS AS DIRECTED:   AMOXICILLIN  OVER THE COUNTER    TYLENOL FOR FEVER/PAIN,   OR TYLENOL SORE THROAT LIQUID  VITAMIN C, D, ZINC, PROBIOTIC

## 2024-07-18 NOTE — ED PROVIDER NOTES
Encounter Date: 7/18/2024       History     Chief Complaint   Patient presents with    Sore Throat     Pt complaining of sore throat and right ear pain since Monday     PT IS A 23 YR OLD BF WITH SORE THROAT ONSET 3 D AGO WITH R OTALGIA.   PT DENIES ADDITIONAL SYMPTOMS INCLUDING CONGESTION, COUGH, FEVER/CHILLS, N/V/D, OR RASH.  PT DENIES PREGNANCY.    The history is provided by the patient.     Review of patient's allergies indicates:  No Known Allergies  Past Medical History:   Diagnosis Date    Appendicitis     Asthma 12/22/2022    Breast lump     Breast mass      Past Surgical History:   Procedure Laterality Date    APPENDECTOMY      BREAST BIOPSY Right 5/3/2021    Procedure: BIOPSY, BREAST, WITH LUMPECTOMY;  Surgeon: Eva Farmer MD;  Location: [facility] [place];  Service: General;  Laterality: Right;    TONSILLECTOMY Bilateral 11/19/2021    Procedure: TONSILLECTOMY;  Surgeon: Heber Tatum MD;  Location: [facility] OR;  Service: ENT;  Laterality: Bilateral;     Family History   Problem Relation Name Age of Onset    Hypertension Mother      Heart disease Father      Hypertension Father      Asthma Brother      Diabetes Maternal Grandmother      Hypertension Maternal Grandmother      Heart disease Maternal Grandfather      Hypertension Maternal Grandfather       Social History     Tobacco Use    Smoking status: Some Days     Types: Vaping with nicotine    Smokeless tobacco: Never   Substance Use Topics    Alcohol use: Yes    Drug use: Never     Review of Systems   Constitutional:  Negative for appetite change, fatigue and fever.   HENT:  Positive for sore throat.    Eyes: Negative.    Respiratory: Negative.  Negative for shortness of breath.    Cardiovascular: Negative.  Negative for chest pain.   Gastrointestinal: Negative.  Negative for nausea.   Endocrine: Negative.    Genitourinary: Negative.  Negative for dysuria.   Musculoskeletal: Negative.  Negative for back pain.   Skin:  Negative for rash.    Allergic/Immunologic: Positive for immunocompromised state.   Neurological: Negative.  Negative for weakness.   Hematological: Negative.  Does not bruise/bleed easily.   Psychiatric/Behavioral: Negative.     All other systems reviewed and are negative.      Physical Exam     Initial Vitals [07/18/24 1213]   BP Pulse Resp Temp SpO2   124/76 99 18 98.7 °F (37.1 °C) 99 %      MAP       --         Physical Exam    Nursing note and vitals reviewed.  Constitutional: She appears well-developed and well-nourished. She is cooperative. She appears distressed.   HENT:   Head: Normocephalic and atraumatic.   Right Ear: External ear normal.   Left Ear: External ear normal.   Nose: Nose normal.   ERYTHEMA OF PHARYNX, NO ABSCESS   Eyes: Conjunctivae and EOM are normal. Pupils are equal, round, and reactive to light. Right eye exhibits no discharge.   Neck: Trachea normal. Neck supple.   Normal range of motion.  Cardiovascular:  Normal rate, regular rhythm, normal heart sounds and intact distal pulses.     Exam reveals no gallop and no friction rub.       No murmur heard.  Pulses:       Radial pulses are 3+ on the right side and 3+ on the left side.        Dorsalis pedis pulses are 3+ on the right side and 3+ on the left side.   Pulmonary/Chest: Breath sounds normal. No respiratory distress. She has no wheezes. She has no rhonchi. She has no rales. She exhibits no tenderness.   Abdominal: Abdomen is soft. Bowel sounds are normal. She exhibits no distension. There is no abdominal tenderness.   Musculoskeletal:         General: Normal range of motion.      Right shoulder: Normal.      Left shoulder: Normal.      Right upper arm: Normal.      Left upper arm: Normal.      Right elbow: Normal.      Left elbow: Normal.      Right forearm: Normal.      Left forearm: Normal.      Right wrist: Normal.      Left wrist: Normal.      Right hand: Normal.      Left hand: Normal.      Cervical back: Normal range of motion and neck supple.      Lymphadenopathy:     She has cervical adenopathy.     She has no axillary adenopathy.   Neurological: She is alert and oriented to person, place, and time. She has normal strength and normal reflexes. No cranial nerve deficit or sensory deficit. She displays a negative Romberg sign. GCS eye subscore is 4. GCS verbal subscore is 5. GCS motor subscore is 6.   Reflex Scores:       Bicep reflexes are 2+ on the right side and 2+ on the left side.       Patellar reflexes are 2+ on the right side and 2+ on the left side.  Skin: Skin is warm and dry. Capillary refill takes less than 2 seconds. No rash noted.   Psychiatric: She has a normal mood and affect. Her speech is normal. Cognition and memory are normal.         Medical Screening Exam   See Full Note    ED Course   Procedures  Labs Reviewed   STREP A BY MOLECULAR METHOD - Abnormal; Notable for the following components:       Result Value    Group A Strep Molecular Positive (*)     All other components within normal limits   INFLUENZA A & B BY MOLECULAR - Normal   SARS-COV-2 RNA AMPLIFICATION, QUAL - Normal    Narrative:     Negative SARS-CoV results should not be used as the sole basis for treatment or patient management decisions; negative results should be considered in the context of a patient's recent exposures, history and the presene of clinical signs and symptoms consistent with COVID-19.  Negative results should be treated as presumptive and confirmed by molecular assay, if necessary for patient management.          Imaging Results    None          Medications   cefTRIAXone injection 1 g (1 g Intramuscular Given 7/18/24 1313)   LIDOcaine HCL 10 mg/ml (1%) injection 2.1 mL (2.1 mLs Intramuscular Given 7/18/24 1313)     Medical Decision Making  PT IS A 23 YR OLD BF WITH SORE THROAT ONSET 3 D AGO WITH R OTALGIA.   PT DENIES ADDITIONAL SYMPTOMS INCLUDING CONGESTION, COUGH, FEVER/CHILLS, N/V/D, OR RASH.  PT DENIES PREGNANCY.    Amount and/or Complexity of Data  Reviewed  Labs: ordered.     Details: Viewed and Other Results - Labs      Updated   Order   07/18/24 1253  Influenza A & B by Molecular  Collected: 07/18/24 1221  Final result  Specimen: Nasopharyngeal Swab      INFLUENZA A MOLECULAR Negative  INFLUENZA B MOLECULAR Negative       07/18/24 1253  COVID-19 Rapid Screening  Collected: 07/18/24 1221  Final result  Specimen: Nasal Swab      SARS COV-2 Molecular Negative       07/18/24 1253  Strep A by Molecular Method  Collected: 07/18/24 1221  Final result  Specimen: Throat      Group A Strep Molecular Positive Abnormal             Discussion of management or test interpretation with external provider(s): EXAM  POS STREP   ROCEPHIN  DC HOME IN STABLE CONDITION                                      Clinical Impression:   Final diagnoses:  [J02.0] Strep pharyngitis (Primary)        ED Disposition Condition    Discharge Stable          ED Prescriptions       Medication Sig Dispense Start Date End Date Auth. Provider    amoxicillin (AMOXIL) 500 MG Tab Take 1 tablet (500 mg total) by mouth 3 (three) times daily. for 10 days 30 tablet 7/18/2024 7/28/2024 Mary Kate Bobby MD          Follow-up Information    None          Mary Kate Bobby MD  07/18/24 1400

## 2024-07-23 ENCOUNTER — TELEPHONE (OUTPATIENT)
Dept: EMERGENCY MEDICINE | Facility: HOSPITAL | Age: 24
End: 2024-07-23
Payer: COMMERCIAL

## 2024-08-20 ENCOUNTER — HOSPITAL ENCOUNTER (EMERGENCY)
Facility: HOSPITAL | Age: 24
Discharge: HOME | End: 2024-08-20
Payer: COMMERCIAL

## 2024-08-20 VITALS
SYSTOLIC BLOOD PRESSURE: 129 MMHG | HEIGHT: 66 IN | TEMPERATURE: 98 F | OXYGEN SATURATION: 99 % | HEART RATE: 88 BPM | BODY MASS INDEX: 40.18 KG/M2 | DIASTOLIC BLOOD PRESSURE: 70 MMHG | RESPIRATION RATE: 16 BRPM | WEIGHT: 250 LBS

## 2024-08-20 DIAGNOSIS — K04.7 DENTAL INFECTION: Primary | ICD-10-CM

## 2024-08-20 RX ORDER — AMOXICILLIN 500 MG/1
500 TABLET, FILM COATED ORAL EVERY 12 HOURS
Qty: 20 TABLET | Refills: 0 | Status: SHIPPED | OUTPATIENT
Start: 2024-08-20 | End: 2024-08-30

## 2024-08-21 NOTE — DISCHARGE INSTRUCTIONS
Follow up with Dentist as soon as possible.   Complete full course of antibiotics.   Motrin 600 mg every 8 hours as needed for pain.   Tylenol 650 mg every 4 hours as needed for pain.  Orajel to area as needed.  Brush teeth after every meal and frequently throughout the day.   May try soft diet and avoiding chewing on affected side until see dentist.  May try ice and/or heat to jaw of affected side to relieve pain.  Return to emergency department for any future emergencies.

## 2024-08-21 NOTE — ED PROVIDER NOTES
Encounter Date: 8/20/2024       History     Chief Complaint   Patient presents with    Dental Pain     Right upper back pain     23-year-old  female presents to ED complaining of right upper jaw toothache that has worsened today.  States proximally 2 weeks ago while biting on a below pop she broke her tooth and is unable to get into the dentist until 09/09/2024. She has tried Tylenol this a.m. and has been using heat and ice to the area with rinsing her mouth thoroughly after eating.  States today her right upper jaw jaw was swollen and her gum felt really painful.         The history is provided by the patient.     Review of patient's allergies indicates:  No Known Allergies  Past Medical History:   Diagnosis Date    Appendicitis     Asthma 12/22/2022    Breast lump     Breast mass      Past Surgical History:   Procedure Laterality Date    APPENDECTOMY      BREAST BIOPSY Right 5/3/2021    Procedure: BIOPSY, BREAST, WITH LUMPECTOMY;  Surgeon: Eva Farmer MD;  Location: [facility] OR;  Service: General;  Laterality: Right;    TONSILLECTOMY Bilateral 11/19/2021    Procedure: TONSILLECTOMY;  Surgeon: Heber Tatum MD;  Location: [facility] OR;  Service: ENT;  Laterality: Bilateral;     Family History   Problem Relation Name Age of Onset    Hypertension Mother      Heart disease Father      Hypertension Father      Asthma Brother      Diabetes Maternal Grandmother      Hypertension Maternal Grandmother      Heart disease Maternal Grandfather      Hypertension Maternal Grandfather       Social History     Tobacco Use    Smoking status: Some Days     Types: Vaping with nicotine    Smokeless tobacco: Never   Substance Use Topics    Alcohol use: Yes    Drug use: Never     Review of Systems   Constitutional:  Negative for chills and fever.   HENT:  Positive for dental problem (Right upper tooth and jaw) and facial swelling. Negative for mouth sores, rhinorrhea, sore throat and tinnitus.    Eyes:   Negative for visual disturbance.   Respiratory:  Negative for cough and shortness of breath.    Cardiovascular:  Negative for chest pain, palpitations and leg swelling.   Gastrointestinal:  Negative for abdominal pain, constipation, diarrhea, nausea and vomiting.   Genitourinary:  Negative for dysuria, flank pain, frequency and hematuria.   Musculoskeletal:  Negative for neck pain and neck stiffness.   Skin:  Negative for rash and wound.   Neurological:  Negative for weakness and headaches.   Hematological:  Does not bruise/bleed easily.   Psychiatric/Behavioral:  Negative for suicidal ideas.        Physical Exam     Initial Vitals [08/20/24 2113]   BP Pulse Resp Temp SpO2   129/70 88 16 98.4 °F (36.9 °C) 99 %      MAP       --         Physical Exam    Nursing note and vitals reviewed.  Constitutional: She appears well-developed and well-nourished. No distress.   HENT:   Head: Normocephalic.   Right Ear: External ear normal.   Left Ear: External ear normal.   Mouth/Throat: Oropharynx is clear and moist. No oropharyngeal exudate.       Fractured tooth #1. Right upper jaw with gum swelling/irritation and jaw pain   Eyes: Conjunctivae and EOM are normal. Pupils are equal, round, and reactive to light. No scleral icterus.   Neck:   Normal range of motion.  Cardiovascular:  Normal rate, regular rhythm, normal heart sounds and intact distal pulses.           No murmur heard.  Pulmonary/Chest: Breath sounds normal. No respiratory distress. She has no wheezes.   Abdominal: Abdomen is soft. Bowel sounds are normal. She exhibits no distension. There is no abdominal tenderness.   Musculoskeletal:         General: Normal range of motion.      Cervical back: Normal range of motion.     Lymphadenopathy:     She has no cervical adenopathy.   Neurological: She is alert and oriented to person, place, and time. She has normal strength. GCS score is 15. GCS eye subscore is 4. GCS verbal subscore is 5. GCS motor subscore is 6.   Skin:  Skin is warm and dry. Capillary refill takes less than 2 seconds.   Psychiatric: She has a normal mood and affect.         Medical Screening Exam   See Full Note    ED Course   Procedures  Labs Reviewed - No data to display       Imaging Results    None          Medications - No data to display  Medical Decision Making  At this time, I do not feel that radiology studies or lab will add any benefit to care or diagnostics since there is no reported injury, signs of trauma.  Patient has swollen and irritated gum surrounding the fracture tooth and is prescribed amoxicillin to prevent further infection. Patient with fractured tooth is scheduled to follow up with dentist for further examination and care. I feel that patient has reached maximum benefit from ED evaluation, treatment and observation. I thoroughly explained all findings to patient to the best of my ability and answered all questions to their satisfaction. I educated patient on the general/expected course of the condition and treatment options in ED and after discharge. I also informed patient that our evaluation in the emergency department today is an evaluation of the condition in one moment in time. If there is any worsening of the condition of if symptoms persist, the patient has been instructed to return to the ED immediately for further evaluation. Patient has also been advised to follow up with PCP in 2-3 days for re-evaluation. Patient verbalized understanding of the instructions and is agreeable to disposition. No questions or concerns at this time.     Dx:  Toothache; fractured tooth; gum abscess    Amount and/or Complexity of Data Reviewed  Independent Historian:      Details: 23-year-old  female presents to ED complaining of right upper jaw toothache that has worsened today.  States proximally 2 weeks ago while biting on a below pop she broke her tooth and is unable to get into the dentist until 09/09/2024. She has tried Tylenol  this a.m. and has been using heat and ice to the area with rinsing her mouth thoroughly after eating.  States today her right upper jaw jaw was swollen and her gum felt really painful.       Risk  Prescription drug management.                                      Clinical Impression:   Final diagnoses:  [K04.7] Dental infection (Primary)        ED Disposition Condition    Discharge Stable          ED Prescriptions       Medication Sig Dispense Start Date End Date Auth. Provider    amoxicillin (AMOXIL) 500 MG Tab Take 1 tablet (500 mg total) by mouth every 12 (twelve) hours. for 10 days 20 tablet 8/20/2024 8/30/2024 Kendal Potter NP          Follow-up Information    None          Kendal Potter NP  08/20/24 2144       Kendal Potter NP  08/20/24 2144

## 2024-08-21 NOTE — ED TRIAGE NOTES
To ED with c/o toothache to R upper jaw.  States tooth came out in pieces after biting on hard candy. Statesthis happened 2 weeks ago and tooth became very painful Saturday.

## 2024-08-30 NOTE — ADDENDUM NOTE
Encounter addended by: Candi Reyes on: 8/30/2024 7:12 AM   Actions taken: SmartForm saved, Charge Capture section accepted

## 2024-09-20 ENCOUNTER — HOSPITAL ENCOUNTER (EMERGENCY)
Facility: HOSPITAL | Age: 24
Discharge: HOME | End: 2024-09-20
Payer: COMMERCIAL

## 2024-09-20 VITALS
BODY MASS INDEX: 47.09 KG/M2 | WEIGHT: 293 LBS | DIASTOLIC BLOOD PRESSURE: 83 MMHG | HEIGHT: 66 IN | TEMPERATURE: 98 F | RESPIRATION RATE: 18 BRPM | OXYGEN SATURATION: 99 % | HEART RATE: 78 BPM | SYSTOLIC BLOOD PRESSURE: 135 MMHG

## 2024-09-20 DIAGNOSIS — J06.9 VIRAL UPPER RESPIRATORY TRACT INFECTION WITH COUGH: Primary | ICD-10-CM

## 2024-09-20 LAB
GROUP A STREP MOLECULAR (OHS): NEGATIVE
INFLUENZA A MOLECULAR (OHS): NEGATIVE
INFLUENZA B MOLECULAR (OHS): NEGATIVE
SARS-COV-2 RDRP RESP QL NAA+PROBE: NEGATIVE

## 2024-09-20 PROCEDURE — 87651 STREP A DNA AMP PROBE: CPT

## 2024-09-20 PROCEDURE — 99284 EMERGENCY DEPT VISIT MOD MDM: CPT | Mod: ,,,

## 2024-09-20 PROCEDURE — 87502 INFLUENZA DNA AMP PROBE: CPT

## 2024-09-20 PROCEDURE — 25000003 PHARM REV CODE 250

## 2024-09-20 PROCEDURE — 99283 EMERGENCY DEPT VISIT LOW MDM: CPT

## 2024-09-20 PROCEDURE — 87635 SARS-COV-2 COVID-19 AMP PRB: CPT

## 2024-09-20 RX ORDER — ACETAMINOPHEN 325 MG/1
650 TABLET ORAL
Status: COMPLETED | OUTPATIENT
Start: 2024-09-20 | End: 2024-09-20

## 2024-09-20 RX ADMIN — ACETAMINOPHEN 325MG 650 MG: 325 TABLET ORAL at 10:09

## 2024-09-20 NOTE — Clinical Note
"Tulio Chance (Jaquandria) was seen and treated in our emergency department on 9/20/2024.  She may return to work on 09/23/2024.       If you have any questions or concerns, please don't hesitate to call.       RN    "

## 2024-09-20 NOTE — DISCHARGE INSTRUCTIONS
Follow up with Primary Care Provider in the next  2-3 days for re-evaluation.  Tylenol  650 mg every 4 hours and Motrin 600 mg every 8 hours as needed for pain/fever  Over the counter medications as directed for symptom relief- Flonase 2 sprays each nostril daily and Mucinex DM as directed for cough and congestion.  Drink plenty of fluid.  Warm water and salt gargle frequently throughout the day.  Return to emergency department for any future emergencies.

## 2024-09-20 NOTE — ED PROVIDER NOTES
Encounter Date: 9/20/2024       History     Chief Complaint   Patient presents with    Headache     Pt complaining of headache, sore throat, cough, and back pain     23-year-old  female presents to ED complaining of headache, sore throat, dry cough, and body aches x 3 days.  States she has been doing saltwater gargles and cool liquids with minimal relief.  She is able to speak in clear and complete sentences.  She has stable vital signs with room air oxygen saturation 99% or better.  PMH includes asthma, appendectomy, tonsillectomy, and breast biopsy.        The history is provided by the patient.     Review of patient's allergies indicates:  No Known Allergies  Past Medical History:   Diagnosis Date    Appendicitis     Asthma 12/22/2022    Breast lump     Breast mass      Past Surgical History:   Procedure Laterality Date    APPENDECTOMY      BREAST BIOPSY Right 5/3/2021    Procedure: BIOPSY, BREAST, WITH LUMPECTOMY;  Surgeon: Eva Farmer MD;  Location: [facility] OR;  Service: General;  Laterality: Right;    TONSILLECTOMY Bilateral 11/19/2021    Procedure: TONSILLECTOMY;  Surgeon: Heber Tatum MD;  Location: [facility];  Service: ENT;  Laterality: Bilateral;     Family History   Problem Relation Name Age of Onset    Hypertension Mother      Heart disease Father      Hypertension Father      Asthma Brother      Diabetes Maternal Grandmother      Hypertension Maternal Grandmother      Heart disease Maternal Grandfather      Hypertension Maternal Grandfather       Social History     Tobacco Use    Smoking status: Some Days     Types: Vaping with nicotine    Smokeless tobacco: Never   Substance Use Topics    Alcohol use: Yes    Drug use: Never     Review of Systems   Constitutional:  Negative for chills and fever.   HENT:  Positive for congestion, rhinorrhea and sore throat.    Eyes:  Negative for visual disturbance.   Respiratory:  Positive for cough.    Cardiovascular:  Negative for chest  pain, palpitations and leg swelling.   Gastrointestinal:  Negative for abdominal pain, diarrhea, nausea and vomiting.   Genitourinary:  Negative for dysuria, flank pain, frequency and hematuria.   Musculoskeletal:  Positive for myalgias. Negative for neck pain and neck stiffness.   Skin:  Negative for rash and wound.   Neurological:  Positive for headaches. Negative for weakness.   Hematological:  Does not bruise/bleed easily.   Psychiatric/Behavioral:  Negative for suicidal ideas.        Physical Exam     Initial Vitals [09/20/24 1013]   BP Pulse Resp Temp SpO2   135/83 78 18 98.3 °F (36.8 °C) 99 %      MAP       --         Physical Exam    Nursing note and vitals reviewed.  Constitutional: She appears well-developed and well-nourished. No distress.   HENT:   Head: Normocephalic.   Right Ear: External ear normal.   Left Ear: External ear normal.   Mouth/Throat: No oropharyngeal exudate.   Red oropharynx   Eyes: Conjunctivae and EOM are normal. Pupils are equal, round, and reactive to light. No scleral icterus.   Neck:   Normal range of motion.  Cardiovascular:  Normal rate, regular rhythm, normal heart sounds and intact distal pulses.           No murmur heard.  Pulmonary/Chest: Breath sounds normal. No respiratory distress. She has no wheezes.   Abdominal: Abdomen is soft. Bowel sounds are normal. She exhibits no distension. There is no abdominal tenderness.   Musculoskeletal:         General: Normal range of motion.      Cervical back: Normal range of motion.     Lymphadenopathy:     She has no cervical adenopathy.   Neurological: She is alert and oriented to person, place, and time. She has normal strength. GCS score is 15. GCS eye subscore is 4. GCS verbal subscore is 5. GCS motor subscore is 6.   Skin: Skin is warm and dry. Capillary refill takes less than 2 seconds.   Psychiatric: She has a normal mood and affect.         Medical Screening Exam   See Full Note    ED Course   Procedures  Labs Reviewed    INFLUENZA A & B BY MOLECULAR - Normal       Result Value    INFLUENZA A MOLECULAR Negative      INFLUENZA B MOLECULAR  Negative     SARS-COV-2 RNA AMPLIFICATION, QUAL - Normal    SARS COV-2 Molecular Negative      Narrative:     Negative SARS-CoV results should not be used as the sole basis for treatment or patient management decisions; negative results should be considered in the context of a patient's recent exposures, history and the presene of clinical signs and symptoms consistent with COVID-19.  Negative results should be treated as presumptive and confirmed by molecular assay, if necessary for patient management.   STREP A BY MOLECULAR METHOD - Normal    Group A Strep Molecular Negative            Imaging Results    None          Medications   acetaminophen tablet 650 mg (650 mg Oral Given 9/20/24 1032)     Medical Decision Making  The presentation of Tulio Chance is consistent with upper respiratory infection, viral in nature. There were no clinical or ancillary findings suggestive of bronchitis, pneumonia, acute sinusitis, chronic sinusitis, or streptococcal pharyngitis, thus there was no indications for antibiotics.    Upon discharge, Tulio Chance has no evidence of respiratory failure and is comfortable without respiratory distress. Additionally, Tluio Chance has no evidence of airway compromise and is speaking in full/complete sentences without difficulty. Tulio Chance meets outpatient treatment criteria.    Data Reviewed/Counseling: I have reviewed the patient's vital signs, nursing notes, and other relevant ancillary testing/information. I have had a detailed discussion with the patient regarding the historical points, examination findings, and any diagnostic results. I have also discussed the need for outpatient follow-up. I have recommended symptomatic therapy with over the counter remedies. Symptomatic therapy suggested: Flonase and Mucinex. Increase fluids, use vaporizer, stay in  steamy bathroom tid 15 min prn severe cough, Tylenol/Motrin as needed, rest, avoid smoky areas. Follow up with PCP in 2-3 days if sx persist.    Tulio Chance has been counseled to return to the Emergency Department if symptoms worsen or if there are any questions or concerns that arise while at home.    Tulio Chance was encouraged to practice good infection control procedures to include but not limited to frequent hand washing to lessen likelihood of transmission of this infection.     Amount and/or Complexity of Data Reviewed  Independent Historian:      Details: 23-year-old  female presents to ED complaining of headache, sore throat, dry cough, and body aches x 3 days.  States she has been doing saltwater gargles and cool liquids with minimal relief.  She is able to speak in clear and complete sentences.  She has stable vital signs with room air oxygen saturation 99% or better.  PMH includes asthma, appendectomy, tonsillectomy, and breast biopsy.    Labs: ordered.     Details: COVID/flu/strep swabs negative    Risk  OTC drugs.                                      Clinical Impression:   Final diagnoses:  [J06.9] Viral upper respiratory tract infection with cough (Primary)        ED Disposition Condition    Discharge Stable          ED Prescriptions    None       Follow-up Information    None          Kendal Potter NP  09/20/24 2061

## 2024-09-21 ENCOUNTER — TELEPHONE (OUTPATIENT)
Dept: EMERGENCY MEDICINE | Facility: HOSPITAL | Age: 24
End: 2024-09-21
Payer: COMMERCIAL

## 2024-10-15 ENCOUNTER — OFFICE VISIT (OUTPATIENT)
Dept: FAMILY MEDICINE | Facility: CLINIC | Age: 24
End: 2024-10-15
Payer: COMMERCIAL

## 2024-10-15 VITALS
RESPIRATION RATE: 20 BRPM | TEMPERATURE: 99 F | BODY MASS INDEX: 46.55 KG/M2 | SYSTOLIC BLOOD PRESSURE: 106 MMHG | HEIGHT: 66 IN | DIASTOLIC BLOOD PRESSURE: 66 MMHG | HEART RATE: 72 BPM | WEIGHT: 289.63 LBS | OXYGEN SATURATION: 100 %

## 2024-10-15 DIAGNOSIS — R21 RASH: ICD-10-CM

## 2024-10-15 DIAGNOSIS — R19.7 DIARRHEA, UNSPECIFIED TYPE: Primary | ICD-10-CM

## 2024-10-15 DIAGNOSIS — R11.2 NAUSEA AND VOMITING, UNSPECIFIED VOMITING TYPE: ICD-10-CM

## 2024-10-15 DIAGNOSIS — R50.9 FEVER, UNSPECIFIED FEVER CAUSE: ICD-10-CM

## 2024-10-15 DIAGNOSIS — F41.9 ANXIETY: ICD-10-CM

## 2024-10-15 LAB
CTP QC/QA: YES
SARS-COV-2 RDRP RESP QL NAA+PROBE: NEGATIVE

## 2024-10-15 PROCEDURE — 87635 SARS-COV-2 COVID-19 AMP PRB: CPT | Mod: QW,,, | Performed by: NURSE PRACTITIONER

## 2024-10-15 PROCEDURE — 3074F SYST BP LT 130 MM HG: CPT | Mod: CPTII,,, | Performed by: NURSE PRACTITIONER

## 2024-10-15 PROCEDURE — 3008F BODY MASS INDEX DOCD: CPT | Mod: CPTII,,, | Performed by: NURSE PRACTITIONER

## 2024-10-15 PROCEDURE — 99213 OFFICE O/P EST LOW 20 MIN: CPT | Mod: ,,, | Performed by: NURSE PRACTITIONER

## 2024-10-15 PROCEDURE — 1159F MED LIST DOCD IN RCRD: CPT | Mod: CPTII,,, | Performed by: NURSE PRACTITIONER

## 2024-10-15 PROCEDURE — 3078F DIAST BP <80 MM HG: CPT | Mod: CPTII,,, | Performed by: NURSE PRACTITIONER

## 2024-10-15 PROCEDURE — 1160F RVW MEDS BY RX/DR IN RCRD: CPT | Mod: CPTII,,, | Performed by: NURSE PRACTITIONER

## 2024-10-15 RX ORDER — NYSTATIN 100000 U/G
CREAM TOPICAL 2 TIMES DAILY PRN
Qty: 15 G | Refills: 0 | Status: SHIPPED | OUTPATIENT
Start: 2024-10-15

## 2024-10-15 NOTE — LETTER
October 15, 2024      [facility]  [address] [address]  Phone: [phone]  Fax: [phone]       Patient: Tulio Chance   YOB: 2000  Date of Visit: 10/15/2024    To Whom It May Concern:    Pablito Chance  was at [facility] on 10/15/2024. The patient may return to work/school on 10/17/2024 with no restrictions. If you have any questions or concerns, or if I can be of further assistance, please do not hesitate to contact me.    Sincerely,    YANN Castro

## 2024-10-15 NOTE — PROGRESS NOTES
Clinic Note    Tulio Chance is a 23 y.o. female     Chief Complaint:   Chief Complaint   Patient presents with    Diarrhea    Emesis    Fever    Health Maintenance     Pneumococcal Vaccines (Age 0-64)(1 of 2 - PCV) Never done  Pap Smear Never done  TETANUS VACCINE due on 07/16/2024  Influenza Vaccine(1) due on 09/01/2024  COVID-19 Vaccine(3 - 2024-25 season) due on 09/01/2024  Chlamydia Screening due on 09/06/2024         Subjective:    Patient complains of diarrhea and n/v. Symptoms started yesterday. Admits to nausea today but denies further vomiting. Patient report coworker has stomach virus.   Patient also reports rash to bilateral thighs at times. Patient reports present prior to starting menstrual cycle. Denies drainage.  Patient complains of anxiety. States she avoids crowds. States her mom has noticed her behavior being worse lately. 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 Patient states she has never taken meds for anxiety. States she has been seeing counselor at [facility] since she was a child.     Diarrhea   Associated symptoms include abdominal pain, a fever and vomiting. Pertinent negatives include no coughing.   Emesis   Associated symptoms include abdominal pain, diarrhea and a fever. Pertinent negatives include no chest pain or coughing.   Fever   This is a new problem. The current episode started yesterday. The problem occurs rarely. The problem has been gradually worsening. The maximum temperature noted was 101 to 101.9 F. The temperature was taken using an oral thermometer. Associated symptoms include abdominal pain, diarrhea, nausea, a rash and vomiting. Pertinent negatives include no chest pain or coughing. She has tried NSAIDs and fluids for the symptoms. The treatment provided mild relief.        Allergies:   Review of patient's allergies indicates:  No Known Allergies     Past Medical History:  Past  "Medical History:   Diagnosis Date    Appendicitis     Asthma 12/22/2022    Breast lump     Breast mass         Current Medications:    Current Outpatient Medications:     albuterol (PROVENTIL/VENTOLIN HFA) 90 mcg/actuation inhaler, Inhale 1-2 puffs into the lungs every 6 (six) hours as needed for Wheezing. Rescue, Disp: 18 g, Rfl: 2    nystatin (MYCOSTATIN) cream, Apply topically 2 (two) times daily as needed for Dry Skin., Disp: 15 g, Rfl: 0       Review of Systems   Constitutional:  Positive for fever.   Respiratory:  Negative for cough and shortness of breath.    Cardiovascular:  Negative for chest pain.   Gastrointestinal:  Positive for abdominal pain, diarrhea, nausea and vomiting. Negative for constipation.   Integumentary:  Positive for rash.   Psychiatric/Behavioral:  Negative for self-injury and suicidal ideas. The patient is nervous/anxious.           Objective:    /66   Pulse 72   Temp 98.6 °F (37 °C) (Oral)   Resp 20   Ht 5' 6" (1.676 m)   Wt 131.4 kg (289 lb 9.6 oz)   LMP 09/19/2024 (Exact Date)   SpO2 100%   BMI 46.74 kg/m²      Physical Exam  Constitutional:       Appearance: Normal appearance. She is obese.   Eyes:      Extraocular Movements: Extraocular movements intact.   Cardiovascular:      Rate and Rhythm: Normal rate and regular rhythm.      Pulses: Normal pulses.      Heart sounds: Normal heart sounds.   Pulmonary:      Effort: Pulmonary effort is normal.      Breath sounds: Normal breath sounds.   Abdominal:      General: Bowel sounds are normal.      Palpations: Abdomen is soft.      Tenderness: There is no abdominal tenderness. There is no guarding or rebound.   Neurological:      Mental Status: She is alert and oriented to person, place, and time.   Psychiatric:         Mood and Affect: Mood normal.          Assessment and Plan:    1. Diarrhea, unspecified type    2. Fever, unspecified fever cause    3. Nausea and vomiting, unspecified vomiting type    4. Rash    5. Anxiety  "        Diarrhea, unspecified type  -bland diet and advance as tolerated    Fever, unspecified fever cause  -     POCT COVID-19 Rapid Screening  -alternate tylenol and ibuprofen prn fever/aches    Nausea and vomiting, unspecified vomiting type  -bland diet and advance as tolerated  -small sips of fluids    Rash  -     nystatin (MYCOSTATIN) cream; Apply topically 2 (two) times daily as needed for Dry Skin.  Dispense: 15 g; Refill: 0    Anxiety  -f/u with [facility]. Discuss med management/counseling since established at [facility]        There are no Patient Instructions on file for this visit.   Follow up if symptoms worsen or fail to improve.

## 2024-10-26 ENCOUNTER — HOSPITAL ENCOUNTER (EMERGENCY)
Facility: HOSPITAL | Age: 24
Discharge: HOME | End: 2024-10-26
Payer: COMMERCIAL

## 2024-10-26 VITALS
HEART RATE: 62 BPM | WEIGHT: 293 LBS | OXYGEN SATURATION: 99 % | DIASTOLIC BLOOD PRESSURE: 76 MMHG | HEIGHT: 66 IN | BODY MASS INDEX: 47.09 KG/M2 | TEMPERATURE: 98 F | RESPIRATION RATE: 18 BRPM | SYSTOLIC BLOOD PRESSURE: 140 MMHG

## 2024-10-26 DIAGNOSIS — J32.9 SINUSITIS, UNSPECIFIED CHRONICITY, UNSPECIFIED LOCATION: Primary | ICD-10-CM

## 2024-10-26 DIAGNOSIS — J00 COMMON COLD: ICD-10-CM

## 2024-10-26 DIAGNOSIS — H61.20 EXCESSIVE CERUMEN IN EAR CANAL, UNSPECIFIED LATERALITY: ICD-10-CM

## 2024-10-26 PROCEDURE — 96372 THER/PROPH/DIAG INJ SC/IM: CPT | Performed by: PHYSICIAN ASSISTANT

## 2024-10-26 PROCEDURE — 99284 EMERGENCY DEPT VISIT MOD MDM: CPT | Mod: 25

## 2024-10-26 PROCEDURE — 99284 EMERGENCY DEPT VISIT MOD MDM: CPT | Mod: ,,, | Performed by: PHYSICIAN ASSISTANT

## 2024-10-26 PROCEDURE — 63600175 PHARM REV CODE 636 W HCPCS: Performed by: PHYSICIAN ASSISTANT

## 2024-10-26 RX ORDER — KETOROLAC TROMETHAMINE 30 MG/ML
30 INJECTION, SOLUTION INTRAMUSCULAR; INTRAVENOUS
Status: COMPLETED | OUTPATIENT
Start: 2024-10-26 | End: 2024-10-26

## 2024-10-26 RX ADMIN — KETOROLAC TROMETHAMINE 30 MG: 30 INJECTION, SOLUTION INTRAMUSCULAR; INTRAVENOUS at 07:10

## 2024-10-28 ENCOUNTER — TELEPHONE (OUTPATIENT)
Dept: EMERGENCY MEDICINE | Facility: HOSPITAL | Age: 24
End: 2024-10-28
Payer: COMMERCIAL

## 2024-11-19 ENCOUNTER — PATIENT MESSAGE (OUTPATIENT)
Dept: RESEARCH | Facility: HOSPITAL | Age: 24
End: 2024-11-19
Payer: COMMERCIAL

## 2024-12-31 ENCOUNTER — HOSPITAL ENCOUNTER (EMERGENCY)
Facility: HOSPITAL | Age: 24
Discharge: HOME | End: 2024-12-31
Attending: EMERGENCY MEDICINE
Payer: COMMERCIAL

## 2024-12-31 VITALS
SYSTOLIC BLOOD PRESSURE: 146 MMHG | WEIGHT: 293 LBS | DIASTOLIC BLOOD PRESSURE: 91 MMHG | HEIGHT: 66 IN | RESPIRATION RATE: 20 BRPM | HEART RATE: 92 BPM | TEMPERATURE: 98 F | OXYGEN SATURATION: 99 % | BODY MASS INDEX: 47.09 KG/M2

## 2024-12-31 DIAGNOSIS — R19.7 DIARRHEA, UNSPECIFIED TYPE: ICD-10-CM

## 2024-12-31 DIAGNOSIS — A08.4 VIRAL GASTROENTERITIS: Primary | ICD-10-CM

## 2024-12-31 DIAGNOSIS — R11.2 NAUSEA AND VOMITING, UNSPECIFIED VOMITING TYPE: ICD-10-CM

## 2024-12-31 LAB
BILIRUB UR QL STRIP: NEGATIVE
CLARITY UR: CLEAR
COLOR UR: YELLOW
GLUCOSE UR STRIP-MCNC: NEGATIVE MG/DL
HCG UR QL IA.RAPID: NEGATIVE
KETONES UR STRIP-SCNC: NEGATIVE MG/DL
LEUKOCYTE ESTERASE UR QL STRIP: NEGATIVE
NITRITE UR QL STRIP: NEGATIVE
PH UR STRIP: 6.5 PH UNITS
PROT UR QL STRIP: NEGATIVE
RBC # UR STRIP: NEGATIVE /UL
SP GR UR STRIP: 1.02
UROBILINOGEN UR STRIP-ACNC: 0.2 MG/DL

## 2024-12-31 PROCEDURE — 99284 EMERGENCY DEPT VISIT MOD MDM: CPT

## 2024-12-31 PROCEDURE — 25000003 PHARM REV CODE 250: Performed by: EMERGENCY MEDICINE

## 2024-12-31 PROCEDURE — 81003 URINALYSIS AUTO W/O SCOPE: CPT | Performed by: EMERGENCY MEDICINE

## 2024-12-31 PROCEDURE — 81025 URINE PREGNANCY TEST: CPT | Performed by: EMERGENCY MEDICINE

## 2024-12-31 PROCEDURE — 99284 EMERGENCY DEPT VISIT MOD MDM: CPT | Mod: ,,, | Performed by: EMERGENCY MEDICINE

## 2024-12-31 RX ORDER — ONDANSETRON 4 MG/1
4 TABLET, ORALLY DISINTEGRATING ORAL
Status: COMPLETED | OUTPATIENT
Start: 2024-12-31 | End: 2024-12-31

## 2024-12-31 RX ORDER — ONDANSETRON 4 MG/1
4 TABLET, FILM COATED ORAL EVERY 8 HOURS PRN
Qty: 10 TABLET | Refills: 0 | Status: SHIPPED | OUTPATIENT
Start: 2024-12-31 | End: 2025-01-03

## 2024-12-31 RX ORDER — PANTOPRAZOLE SODIUM 40 MG/1
40 TABLET, DELAYED RELEASE ORAL DAILY
Qty: 7 TABLET | Refills: 0 | Status: SHIPPED | OUTPATIENT
Start: 2024-12-31 | End: 2025-01-07

## 2024-12-31 RX ORDER — DOCUSATE SODIUM 100 MG
1000 CAPSULE ORAL
Status: DISCONTINUED | OUTPATIENT
Start: 2024-12-31 | End: 2024-12-31 | Stop reason: HOSPADM

## 2024-12-31 RX ADMIN — ONDANSETRON 4 MG: 4 TABLET, ORALLY DISINTEGRATING ORAL at 02:12

## 2024-12-31 RX ADMIN — Medication 1000 ML: at 02:12

## 2024-12-31 NOTE — ED PROVIDER NOTES
Encounter Date: 12/31/2024       History     Chief Complaint   Patient presents with    Vomiting    Diarrhea     PT IS A 24 YR OLD BF WITH HX ONSET NAUSEA AND VOMITING X 4, DIARRHEA X 3 ONSET 2 D AGO. PT DENIES FEVER/CHILLS OR ABDOMINAL PAIN.  PT STATES 16 YR OLD BROTHER WAS ILL WITH SIMILAR SYMPTOMS  AND IS NOW IMPROVED.  PT STATES SHE HAS BEEN DRINKING SPRITE.  PT DENIES PREGNANCY. PT S/P APPENDECTOMY.    Past Medical History    Diagnosis Date Comments  Breast lump [N63.0]    Breast mass [N63.0]    Appendicitis [K37]    Asthma [J45.909]              Review of patient's allergies indicates:  No Known Allergies  Past Medical History:   Diagnosis Date    Appendicitis     Asthma 12/22/2022    Breast lump     Breast mass      Past Surgical History:   Procedure Laterality Date    APPENDECTOMY      BREAST BIOPSY Right 5/3/2021    Procedure: BIOPSY, BREAST, WITH LUMPECTOMY;  Surgeon: Eva Farmer MD;  Location: [facility] [place];  Service: General;  Laterality: Right;    TONSILLECTOMY Bilateral 11/19/2021    Procedure: TONSILLECTOMY;  Surgeon: Heber Tatum MD;  Location: [facility] [place];  Service: ENT;  Laterality: Bilateral;     Family History   Problem Relation Name Age of Onset    Hypertension Mother      Heart disease Father      Hypertension Father      Asthma Brother      Diabetes Maternal Grandmother      Hypertension Maternal Grandmother      Heart disease Maternal Grandfather      Hypertension Maternal Grandfather       Social History     Tobacco Use    Smoking status: Some Days     Types: Vaping with nicotine    Smokeless tobacco: Never   Substance Use Topics    Alcohol use: Yes    Drug use: Never     Review of Systems   Constitutional: Negative.  Negative for appetite change and fatigue.   HENT: Negative.     Eyes: Negative.    Respiratory: Negative.     Cardiovascular: Negative.    Gastrointestinal:  Positive for diarrhea, nausea and vomiting.   Endocrine: Negative.    Genitourinary: Negative.     Musculoskeletal: Negative.    Skin: Negative.    Allergic/Immunologic: Positive for immunocompromised state (VAPING).   Neurological:  Negative for weakness.   Hematological: Negative.    Psychiatric/Behavioral: Negative.     All other systems reviewed and are negative.      Physical Exam     Initial Vitals [12/31/24 1342]   BP Pulse Resp Temp SpO2   (!) 144/103 92 20 98.4 °F (36.9 °C) 99 %      MAP       --         Physical Exam    Nursing note and vitals reviewed.  Constitutional: She appears well-developed and well-nourished. She is cooperative.   HENT:   Head: Normocephalic and atraumatic.   Right Ear: External ear normal.   Left Ear: External ear normal.   Nose: Nose normal. Mouth/Throat: Oropharynx is clear and moist.   Eyes: Conjunctivae and EOM are normal. Pupils are equal, round, and reactive to light.   Neck: Trachea normal. Neck supple.   Normal range of motion.  Cardiovascular:  Regular rhythm, normal heart sounds and intact distal pulses.           No murmur heard.  Pulses:       Radial pulses are 3+ on the right side and 3+ on the left side.        Dorsalis pedis pulses are 3+ on the right side and 3+ on the left side.   Pulmonary/Chest: Breath sounds normal. No respiratory distress.   Abdominal: Abdomen is soft. Bowel sounds are normal. She exhibits no distension. There is no abdominal tenderness.   Musculoskeletal:      Right shoulder: Normal.      Left shoulder: Normal.      Right upper arm: Normal.      Left upper arm: Normal.      Right elbow: Normal.      Left elbow: Normal.      Right forearm: Normal.      Left forearm: Normal.      Right wrist: Normal.      Left wrist: Normal.      Right hand: Normal.      Left hand: Normal.      Cervical back: Normal range of motion and neck supple.     Lymphadenopathy:     She has no cervical adenopathy.     She has no axillary adenopathy.   Neurological: She is alert and oriented to person, place, and time. She has normal strength. No cranial nerve deficit  or sensory deficit. She displays a negative Romberg sign. GCS eye subscore is 4. GCS verbal subscore is 5. GCS motor subscore is 6.   Reflex Scores:       Bicep reflexes are 2+ on the right side and 2+ on the left side.       Patellar reflexes are 2+ on the right side and 2+ on the left side.  Skin: Skin is warm and dry. Capillary refill takes less than 2 seconds.   Psychiatric: She has a normal mood and affect. Her speech is normal. Thought content normal. Cognition and memory are normal.         Medical Screening Exam   See Full Note    ED Course   Procedures  Labs Reviewed   HCG QUALITATIVE URINE - Normal       Result Value    HCG Qualitative, Urine Negative     URINALYSIS    Color, UA Yellow      Clarity, UA Clear      pH, UA 6.5      Leukocytes, UA Negative      Nitrites, UA Negative      Protein, UA Negative      Glucose, UA Negative      Ketones, UA Negative      Urobilinogen, UA 0.2      Bilirubin, UA Negative      Blood, UA Negative      Specific Gravity, UA 1.025            Imaging Results    None          Medications   ondansetron disintegrating tablet 4 mg (4 mg Oral Given 12/31/24 3759)     Medical Decision Making  PT IS A 24 YR OLD BF WITH HX ONSET NAUSEA AND VOMITING X 4, DIARRHEA X 3 ONSET 2 D AGO. PT DENIES FEVER/CHILLS OR ABDOMINAL PAIN.  PT STATES 16 YR OLD BROTHER WAS ILL WITH SIMILAR SYMPTOMS  AND IS NOW IMPROVED.  PT STATES SHE HAS BEEN DRINKING SPRITE.  PT DENIES PREGNANCY. PT S/P APPENDECTOMY.        Amount and/or Complexity of Data Reviewed  Labs: ordered.     Details: New Results - Labs    Updated   Order   12/31/24 1400  HCG Qualitative Urine  Collected: 12/31/24 1353  Final result  Specimen: Urine, Clean Catch      HCG Qualitative, Urine Negative       12/31/24 1400  Urinalysis  Collected: 12/31/24 1353  Final result  Specimen: Urine, Clean Catch      Color, UA Yellow  Clarity, UA Clear  pH, UA 6.5 pH Units  Leukocytes, UA Negative  Nitrites, UA Negative  Protein,  UA Negative  Glucose, UA Negative mg/dL  Ketones, UA Negative mg/dL  Urobilinogen, UA 0.2 mg/dL  Bilirubin, UA Negative  Blood, UA Negative  Specific Gravity, UA 1.025        Discussion of management or test interpretation with external provider(s): EXAM  ZOFRAN  LABS NEG  PT IS IMPROVED  DC HOME IN STABLE CONDITION    Risk  OTC drugs.  Prescription drug management.                                      Clinical Impression:   Final diagnoses:  [A08.4] Viral gastroenteritis (Primary)  [R11.2] Nausea and vomiting, unspecified vomiting type  [R19.7] Diarrhea, unspecified type        ED Disposition Condition    Discharge Stable          ED Prescriptions       Medication Sig Dispense Start Date End Date Auth. Provider    ondansetron (ZOFRAN) 4 MG tablet () Take 1 tablet (4 mg total) by mouth every 8 (eight) hours as needed for Nausea. 10 tablet 2024 1/3/2025 Mray Kate Bobby MD    pantoprazole (PROTONIX) 40 MG tablet () Take 1 tablet (40 mg total) by mouth once daily. for 7 days 7 tablet 2024 Mary Kate Bobby MD          Follow-up Information       Follow up With Specialties Details Why Contact Info    Eric Lee MD Family Medicine, Emergency Medicine In 1 week If symptoms worsen SOONER [address] [facility]  [facility] [id number] [phone]               Mary Kate Bobby MD  [phone]

## 2024-12-31 NOTE — DISCHARGE INSTRUCTIONS
INCREASE REST AND FLUIDS  CLEAR LIQUIDS FOR 1 DAY THEN BLAND DIET  MEDICATIONS AS DIRECTED  PEPCID, ZOFRAN  OVER THE COUNTER   IMMODIUM AS NEEDED FOR DIARRHEA  TYLENOL AS NEEDED FOR PAIN OR FEVER

## 2024-12-31 NOTE — ED TRIAGE NOTES
C/o vomiting and diarrhea for past couple of days. States has had v x 4 today and diarrhea x 3 today. Also c/o headache off and on.    [Joint Pain] : joint pain [Joint Stiffness] : joint stiffness [Negative] : Allergic/Immunologic

## 2024-12-31 NOTE — Clinical Note
"Tulio Hopedaniella" Robson was seen and treated in our emergency department on 12/31/2024.  She may return to work on 01/02/2025.       If you have any questions or concerns, please don't hesitate to call.      Mary Kate Bobby MD"

## 2025-01-01 ENCOUNTER — TELEPHONE (OUTPATIENT)
Dept: EMERGENCY MEDICINE | Facility: HOSPITAL | Age: 25
End: 2025-01-01
Payer: COMMERCIAL

## 2025-01-02 ENCOUNTER — PATIENT OUTREACH (OUTPATIENT)
Dept: EMERGENCY MEDICINE | Facility: HOSPITAL | Age: 25
End: 2025-01-02
Payer: COMMERCIAL

## 2025-01-02 NOTE — PROGRESS NOTES
ED navigator attempted to contact patient to assist with scheduling a post ED 7 day follow up with PCP. Unable to reach patient. Left a message.    Celeste Perdue ED Navigator   [phone]

## 2025-01-03 NOTE — PROGRESS NOTES
ED navigator second attempt to contact patient to assist with scheduling a post ED 7 day follow up with PCP. Unable to reach patient. ED navigator will close encounter at this time.     Celeste Perdue ED Navigator   [phone]

## 2025-01-29 ENCOUNTER — PATIENT OUTREACH (OUTPATIENT)
Facility: HOSPITAL | Age: 25
End: 2025-01-29
Payer: COMMERCIAL

## 2025-01-29 NOTE — PROGRESS NOTES
01/29/2025   --Chart accessed for: Care Gaps  --Care Gaps addressed: pap smear  Outreach made to patient via [facility] . (Success) (Left Message) (Unavailable)   Care Everywhere updates requested and reviewed.  [facility] and [facility] reviewed.  Health Maintenance Due   Topic Date Due    Pneumococcal Vaccines (Age 0-49) (1 of 2 - PCV) Never done    Pap Smear  Never done    TETANUS VACCINE  07/16/2024    Influenza Vaccine (1) 09/01/2024    COVID-19 Vaccine (3 - 2024-25 season) 09/01/2024    Chlamydia Screening  09/06/2024

## 2025-02-05 ENCOUNTER — OFFICE VISIT (OUTPATIENT)
Dept: FAMILY MEDICINE | Facility: CLINIC | Age: 25
End: 2025-02-05
Payer: COMMERCIAL

## 2025-02-05 VITALS
SYSTOLIC BLOOD PRESSURE: 121 MMHG | WEIGHT: 285 LBS | RESPIRATION RATE: 18 BRPM | OXYGEN SATURATION: 97 % | TEMPERATURE: 97 F | DIASTOLIC BLOOD PRESSURE: 80 MMHG | HEART RATE: 75 BPM | BODY MASS INDEX: 45.8 KG/M2 | HEIGHT: 66 IN

## 2025-02-05 DIAGNOSIS — F41.9 ANXIETY: ICD-10-CM

## 2025-02-05 DIAGNOSIS — J45.909 ASTHMA, UNSPECIFIED ASTHMA SEVERITY, UNSPECIFIED WHETHER COMPLICATED, UNSPECIFIED WHETHER PERSISTENT: ICD-10-CM

## 2025-02-05 DIAGNOSIS — Z11.1 SCREENING-PULMONARY TB: ICD-10-CM

## 2025-02-05 DIAGNOSIS — Z23 NEED FOR VACCINATION: Primary | ICD-10-CM

## 2025-02-05 DIAGNOSIS — E66.01 SEVERE OBESITY: ICD-10-CM

## 2025-02-05 PROCEDURE — 99499 UNLISTED E&M SERVICE: CPT | Mod: ,,, | Performed by: FAMILY MEDICINE

## 2025-02-05 PROCEDURE — 86580 TB INTRADERMAL TEST: CPT | Mod: ,,, | Performed by: FAMILY MEDICINE

## 2025-02-05 PROCEDURE — 90656 IIV3 VACC NO PRSV 0.5 ML IM: CPT | Mod: ,,, | Performed by: FAMILY MEDICINE

## 2025-02-05 PROCEDURE — G0008 ADMIN INFLUENZA VIRUS VAC: HCPCS | Mod: ,,, | Performed by: FAMILY MEDICINE

## 2025-02-05 NOTE — PROGRESS NOTES
Eric Lee MD        PATIENT NAME: Tulio Chance  : 2000  DATE: 25  MRN: [id number]      Billing Provider: Eric Lee MD  Level of Service: NM OFFICE/OUTPT VISIT, EST, LEVL IV, 30-39 MIN  Patient PCP Information       Provider PCP Type    Eric Lee MD General            Reason for Visit / Chief Complaint: Annual Exam (Physical for work)       Update PCP  Update Chief Complaint         History of Present Illness / Problem Focused Workflow     Tulio Chance presents to the clinic with Annual Exam (Physical for work)     Routine followup.  No significant interval change        Review of Systems     Review of Systems   Constitutional:  Negative for activity change, appetite change, fever and unexpected weight change.   HENT:  Negative for congestion, rhinorrhea, sinus pressure, sinus pain, sore throat and trouble swallowing.    Eyes:  Negative for photophobia, pain, discharge and visual disturbance.   Respiratory:  Negative for cough, chest tightness, wheezing and stridor.    Cardiovascular:  Negative for chest pain, palpitations and leg swelling.   Gastrointestinal:  Negative for abdominal pain, blood in stool, constipation, diarrhea and nausea.   Endocrine: Negative for polydipsia, polyphagia and polyuria.   Genitourinary:  Negative for difficulty urinating, flank pain and hematuria.   Musculoskeletal:  Negative for arthralgias and neck pain.   Skin:  Negative for rash.   Allergic/Immunologic: Negative for food allergies.   Neurological:  Negative for dizziness, tremors, seizures, syncope, weakness (global weakness) and headaches.   Psychiatric/Behavioral:  Negative for behavioral problems, confusion, decreased concentration, dysphoric mood and hallucinations. The patient is not nervous/anxious.         Medical / Social / Family History     Past Medical History:   Diagnosis Date    Appendicitis     Asthma 2022    Breast lump     Breast mass        Past Surgical History:    Procedure Laterality Date    APPENDECTOMY      BREAST BIOPSY Right 5/3/2021    Procedure: BIOPSY, BREAST, WITH LUMPECTOMY;  Surgeon: Eva Farmer MD;  Location: [facility];  Service: General;  Laterality: Right;    TONSILLECTOMY Bilateral 11/19/2021    Procedure: TONSILLECTOMY;  Surgeon: Heber Tatum MD;  Location[facility];  Service: ENT;  Laterality: Bilateral;       Social History  Ms.  reports that she has been smoking vaping with nicotine. She has never used smokeless tobacco. She reports current alcohol use. She reports that she does not use drugs.    Family History  Ms.'s family history includes Asthma in her brother; Diabetes in her maternal grandmother; Heart disease in her father and maternal grandfather; Hypertension in her father, maternal grandfather, maternal grandmother, and mother.    Medications and Allergies     Medications  No outpatient medications have been marked as taking for the 2/5/25 encounter (Office Visit) with Eric Lee MD.     Current Facility-Administered Medications for the 2/5/25 encounter (Office Visit) with Eric Lee MD   Medication Dose Route Frequency Provider Last Rate Last Admin    [COMPLETED] influenza (Flulaval, Fluzone, Fluarix) 45 mcg/0.5 mL IM vaccine (> or = 6 mo) 0.5 mL  0.5 mL Intramuscular 1 time in Clinic/HOD Eric Lee MD   0.5 mL at 02/05/25 0820       Allergies  Review of patient's allergies indicates:  No Known Allergies    Physical Examination     Vitals:    02/05/25 0813   BP: 121/80   Pulse: 75   Resp: 18   Temp: 97.3 °F (36.3 °C)     Physical Exam  Constitutional:       General: She is not in acute distress.     Appearance: Normal appearance.   HENT:      Head: Normocephalic.      Right Ear: Tympanic membrane and ear canal normal.      Left Ear: Tympanic membrane and ear canal normal.      Nose: Nose normal.      Mouth/Throat:      Mouth: Mucous membranes are moist.      Pharynx: No oropharyngeal exudate.   Eyes:       Extraocular Movements: Extraocular movements intact.      Pupils: Pupils are equal, round, and reactive to light.   Cardiovascular:      Rate and Rhythm: Normal rate and regular rhythm.      Heart sounds: No murmur heard.  Pulmonary:      Effort: Pulmonary effort is normal.      Breath sounds: Normal breath sounds. No wheezing.   Abdominal:      General: Abdomen is flat. Bowel sounds are normal.      Palpations: Abdomen is soft.      Hernia: No hernia is present.   Musculoskeletal:         General: Normal range of motion.      Cervical back: Normal range of motion and neck supple.      Right lower leg: No edema.      Left lower leg: No edema.   Lymphadenopathy:      Cervical: No cervical adenopathy.   Skin:     General: Skin is warm and dry.      Coloration: Skin is not jaundiced.      Findings: No lesion.   Neurological:      General: No focal deficit present.      Mental Status: She is alert and oriented to person, place, and time.      Cranial Nerves: No cranial nerve deficit.      Gait: Gait normal.   Psychiatric:         Mood and Affect: Mood normal.         Behavior: Behavior normal.         Judgment: Judgment normal.          Assessment and Plan (including Health Maintenance)      Problem List  Smart Sets  Document Outside HM   :    Plan:     1. Need for vaccination    -     influenza (Flulaval, Fluzone, Fluarix) 45 mcg/0.5 mL IM vaccine (> or = 6 mo) 0.5 mL    2. Screening-pulmonary TB    -     POCT TB Skin Test Read    3. Severe obesity            Health Maintenance Due   Topic Date Due    Pneumococcal Vaccines (Age 0-49) (1 of 2 - PCV) Never done    Pap Smear  Never done    TETANUS VACCINE  07/16/2024    COVID-19 Vaccine (3 - 2024-25 season) 09/01/2024    Chlamydia Screening  09/06/2024       1. Need for vaccination  -     influenza (Flulaval, Fluzone, Fluarix) 45 mcg/0.5 mL IM vaccine (> or = 6 mo) 0.5 mL    2. Screening-pulmonary TB  -     POCT TB Skin Test Read    3. Severe obesity    4. Anxiety    5.  Asthma, unspecified asthma severity, unspecified whether complicated, unspecified whether persistent         Health Maintenance Topics with due status: Not Due       Topic Last Completion Date    RSV Vaccine (Age 60+ and Pregnant patients) Not Due       Future Appointments   Date Time Provider Department Center   1/23/2026  8:10 AM Eric Lee MD [facility]        There are no Patient Instructions on file for this visit.  Follow up in about 1 year (around 2/5/2026) for routine followup.     Signature:  Eric Lee MD      Date of encounter: 2/5/25

## 2025-02-06 ENCOUNTER — OFFICE VISIT (OUTPATIENT)
Dept: FAMILY MEDICINE | Facility: CLINIC | Age: 25
End: 2025-02-06
Payer: COMMERCIAL

## 2025-02-06 VITALS
WEIGHT: 284 LBS | TEMPERATURE: 99 F | RESPIRATION RATE: 18 BRPM | OXYGEN SATURATION: 100 % | SYSTOLIC BLOOD PRESSURE: 123 MMHG | HEART RATE: 70 BPM | DIASTOLIC BLOOD PRESSURE: 86 MMHG | HEIGHT: 66 IN | BODY MASS INDEX: 45.64 KG/M2

## 2025-02-06 DIAGNOSIS — U07.1 COVID: Primary | ICD-10-CM

## 2025-02-06 DIAGNOSIS — R68.83 CHILLS: ICD-10-CM

## 2025-02-06 LAB
CTP QC/QA: YES
CTP QC/QA: YES
POC MOLECULAR INFLUENZA A AGN: NEGATIVE
POC MOLECULAR INFLUENZA B AGN: NEGATIVE
SARS-COV-2 RDRP RESP QL NAA+PROBE: POSITIVE

## 2025-02-06 PROCEDURE — 3008F BODY MASS INDEX DOCD: CPT | Mod: CPTII,,, | Performed by: NURSE PRACTITIONER

## 2025-02-06 PROCEDURE — 3079F DIAST BP 80-89 MM HG: CPT | Mod: CPTII,,, | Performed by: NURSE PRACTITIONER

## 2025-02-06 PROCEDURE — 87635 SARS-COV-2 COVID-19 AMP PRB: CPT | Mod: QW,,, | Performed by: NURSE PRACTITIONER

## 2025-02-06 PROCEDURE — 99213 OFFICE O/P EST LOW 20 MIN: CPT | Mod: ,,, | Performed by: NURSE PRACTITIONER

## 2025-02-06 PROCEDURE — 1159F MED LIST DOCD IN RCRD: CPT | Mod: CPTII,,, | Performed by: NURSE PRACTITIONER

## 2025-02-06 PROCEDURE — 1160F RVW MEDS BY RX/DR IN RCRD: CPT | Mod: CPTII,,, | Performed by: NURSE PRACTITIONER

## 2025-02-06 PROCEDURE — 3074F SYST BP LT 130 MM HG: CPT | Mod: CPTII,,, | Performed by: NURSE PRACTITIONER

## 2025-02-06 PROCEDURE — 87502 INFLUENZA DNA AMP PROBE: CPT | Mod: QW,,, | Performed by: NURSE PRACTITIONER

## 2025-02-06 NOTE — PROGRESS NOTES
Clinic Note    Tulio Chance is a 24 y.o. female     Chief Complaint:   Chief Complaint   Patient presents with    Emesis    Nausea    Generalized Body Aches     Patient presents on today for nausea and vomiting. Pt received flu shot on yesterday. Pt states that she felt bad before she got the flu shot.        Subjective:    Patient complains of headache, congestion, n/v. Symptoms started yesterday. Woke up with sweats.     Emesis   Associated symptoms include coughing and headaches. Pertinent negatives include no abdominal pain or fever.   Nausea  Associated symptoms include congestion, coughing, headaches, nausea and vomiting. Pertinent negatives include no abdominal pain, fever or sore throat.        Allergies:   Review of patient's allergies indicates:  No Known Allergies     Past Medical History:  Past Medical History:   Diagnosis Date    Appendicitis     Asthma 12/22/2022    Breast lump     Breast mass         Current Medications:    Current Outpatient Medications:     albuterol (PROVENTIL/VENTOLIN HFA) 90 mcg/actuation inhaler, Inhale 1-2 puffs into the lungs every 6 (six) hours as needed for Wheezing. Rescue, Disp: 18 g, Rfl: 2    chlorpheniramine-phenyleph-DM 4-10-10 mg Tab, Take 1 tablet by mouth every 6 (six) hours as needed., Disp: 30 tablet, Rfl: 0    nystatin (MYCOSTATIN) cream, Apply topically 2 (two) times daily as needed for Dry Skin., Disp: 15 g, Rfl: 0    pantoprazole (PROTONIX) 40 MG tablet, Take 1 tablet (40 mg total) by mouth once daily. for 7 days, Disp: 7 tablet, Rfl: 0       Review of Systems   Constitutional:  Negative for fever.   HENT:  Positive for nasal congestion. Negative for sore throat.    Respiratory:  Positive for cough. Negative for shortness of breath.    Gastrointestinal:  Positive for nausea and vomiting. Negative for abdominal pain.   Neurological:  Positive for headaches.          Objective:    /86 (BP Location: Left arm, Patient Position: Sitting)   Pulse 70    "Temp 98.8 °F (37.1 °C) (Oral)   Resp 18   Ht 5' 6" (1.676 m)   Wt 128.8 kg (284 lb)   SpO2 100%   BMI 45.84 kg/m²      Physical Exam  Constitutional:       Appearance: Normal appearance. She is obese.   Eyes:      Extraocular Movements: Extraocular movements intact.   Cardiovascular:      Rate and Rhythm: Normal rate and regular rhythm.      Pulses: Normal pulses.      Heart sounds: Normal heart sounds.   Pulmonary:      Effort: Pulmonary effort is normal.      Breath sounds: Normal breath sounds.   Neurological:      Mental Status: She is alert and oriented to person, place, and time.          Assessment and Plan:    1. COVID    2. Chills         COVID  -     chlorpheniramine-phenyleph-DM 4-10-10 mg Tab; Take 1 tablet by mouth every 6 (six) hours as needed.  Dispense: 30 tablet; Refill: 0  -discussed viral illness and quarantine measures  -alternate tylenol and ibuprofen prn fever/aches    Chills  -     POCT Influenza A/B Molecular  -     POCT COVID-19 Rapid Screening      Results for orders placed or performed in visit on 02/06/25   POCT Influenza A/B Molecular   Result Value Ref Range    POC Molecular Influenza A Ag Negative Negative    POC Molecular Influenza B Ag Negative Negative     Acceptable Yes      Results for orders placed or performed in visit on 02/06/25   POCT COVID-19 Rapid Screening   Result Value Ref Range    POC Rapid COVID Positive (A) Negative     Acceptable Yes        There are no Patient Instructions on file for this visit.   Follow up if symptoms worsen or fail to improve.     "

## 2025-02-28 ENCOUNTER — PATIENT OUTREACH (OUTPATIENT)
Facility: HOSPITAL | Age: 25
End: 2025-02-28
Payer: COMMERCIAL

## 2025-02-28 NOTE — PROGRESS NOTES
Population Health Chart Review & Patient Outreach Details    Updates Requested / Reviewed:  [x]  Care Team Updated  [x]  Care Everywhere Updated & Reviewed  [x]  [facility] Reviewed  [x]   Reviewed      Health Maintenance Topics Addressed and Outreach Outcomes / Actions Taken:  Chlamydia Screening  [x] No documentation found in [facility], [facility], or Care Everywhere for Chlamydia Screening.      [x] Comment placed in chart and upcoming appointment that patient needs this.

## 2025-03-19 ENCOUNTER — OFFICE VISIT (OUTPATIENT)
Dept: FAMILY MEDICINE | Facility: CLINIC | Age: 25
End: 2025-03-19
Payer: COMMERCIAL

## 2025-03-19 VITALS
RESPIRATION RATE: 17 BRPM | HEART RATE: 67 BPM | OXYGEN SATURATION: 98 % | HEIGHT: 66 IN | SYSTOLIC BLOOD PRESSURE: 95 MMHG | BODY MASS INDEX: 45.51 KG/M2 | TEMPERATURE: 98 F | DIASTOLIC BLOOD PRESSURE: 61 MMHG | WEIGHT: 283.19 LBS

## 2025-03-19 DIAGNOSIS — J02.9 SORE THROAT: ICD-10-CM

## 2025-03-19 DIAGNOSIS — R52 GENERALIZED BODY ACHES: ICD-10-CM

## 2025-03-19 DIAGNOSIS — U07.1 COVID: Primary | ICD-10-CM

## 2025-03-19 LAB
CTP QC/QA: YES
MOLECULAR STREP A: NEGATIVE
POC MOLECULAR INFLUENZA A AGN: NEGATIVE
POC MOLECULAR INFLUENZA B AGN: NEGATIVE
SARS-COV-2 RDRP RESP QL NAA+PROBE: POSITIVE

## 2025-03-19 PROCEDURE — 99213 OFFICE O/P EST LOW 20 MIN: CPT | Mod: ,,, | Performed by: NURSE PRACTITIONER

## 2025-03-19 PROCEDURE — 87635 SARS-COV-2 COVID-19 AMP PRB: CPT | Mod: QW,,, | Performed by: NURSE PRACTITIONER

## 2025-03-19 PROCEDURE — 3008F BODY MASS INDEX DOCD: CPT | Mod: CPTII,,, | Performed by: NURSE PRACTITIONER

## 2025-03-19 PROCEDURE — 1159F MED LIST DOCD IN RCRD: CPT | Mod: CPTII,,, | Performed by: NURSE PRACTITIONER

## 2025-03-19 PROCEDURE — 3078F DIAST BP <80 MM HG: CPT | Mod: CPTII,,, | Performed by: NURSE PRACTITIONER

## 2025-03-19 PROCEDURE — 3074F SYST BP LT 130 MM HG: CPT | Mod: CPTII,,, | Performed by: NURSE PRACTITIONER

## 2025-03-19 PROCEDURE — 87502 INFLUENZA DNA AMP PROBE: CPT | Mod: QW,,, | Performed by: NURSE PRACTITIONER

## 2025-03-19 PROCEDURE — 1160F RVW MEDS BY RX/DR IN RCRD: CPT | Mod: CPTII,,, | Performed by: NURSE PRACTITIONER

## 2025-03-19 PROCEDURE — 87651 STREP A DNA AMP PROBE: CPT | Mod: QW,,, | Performed by: NURSE PRACTITIONER

## 2025-03-19 RX ORDER — FLUOXETINE HYDROCHLORIDE 20 MG/1
20 CAPSULE ORAL DAILY
COMMUNITY
Start: 2025-03-12

## 2025-03-19 NOTE — PROGRESS NOTES
"Clinic Note    Tulio Chance is a 24 y.o. female     Chief Complaint:   Chief Complaint   Patient presents with    Nasal Congestion    Headache        Subjective:    Patient complains of headache, body aches, nasal congestion, and sore throat. Symptoms X 1-2 days. Denies fever. Throat feels dry. Denies cough.     Headache   Associated symptoms include a sore throat. Pertinent negatives include no abdominal pain, coughing, fever, nausea or vomiting.        Allergies:   Review of patient's allergies indicates:  No Known Allergies     Past Medical History:  Past Medical History:   Diagnosis Date    Appendicitis     Asthma 12/22/2022    Breast lump     Breast mass         Current Medications:  Current Medications[1]       Review of Systems   Constitutional:  Negative for fever.   HENT:  Positive for nasal congestion and sore throat.    Respiratory:  Negative for cough and shortness of breath.    Cardiovascular:  Negative for chest pain, palpitations and leg swelling.   Gastrointestinal:  Negative for abdominal pain, constipation, diarrhea, nausea and vomiting.   Genitourinary:  Negative for dysuria.   Neurological:  Positive for headaches.          Objective:    BP 95/61 (BP Location: Left arm, Patient Position: Sitting)   Pulse 67   Temp 98 °F (36.7 °C) (Oral)   Resp 17   Ht 5' 6" (1.676 m)   Wt 128.5 kg (283 lb 3.2 oz)   LMP 03/02/2025   SpO2 98%   BMI 45.71 kg/m²      Physical Exam  Constitutional:       Appearance: Normal appearance. She is obese.   Eyes:      Extraocular Movements: Extraocular movements intact.   Cardiovascular:      Rate and Rhythm: Normal rate and regular rhythm.      Pulses: Normal pulses.      Heart sounds: Normal heart sounds.   Pulmonary:      Effort: Pulmonary effort is normal.      Breath sounds: Normal breath sounds.   Neurological:      Mental Status: She is alert and oriented to person, place, and time.          Assessment and Plan:    1. COVID    2. Sore throat    3. " Generalized body aches         COVID  -discussed viral illness and quarantine measures  -alternate tylenol and ibuprofen prn fever/aches    Sore throat  -     POCT Strep A, Molecular    Generalized body aches  -     POCT Influenza A/B Molecular  -     POCT COVID-19 Rapid Screening      Results for orders placed or performed in visit on 03/19/25   POCT Influenza A/B Molecular   Result Value Ref Range    POC Molecular Influenza A Ag Negative Negative    POC Molecular Influenza B Ag Negative Negative     Acceptable Yes      Results for orders placed or performed in visit on 03/19/25   POCT COVID-19 Rapid Screening   Result Value Ref Range    POC Rapid COVID Positive (A) Negative     Acceptable Yes      Results for orders placed or performed in visit on 03/19/25   POCT Strep A, Molecular   Result Value Ref Range    Molecular Strep A, POC Negative Negative     Acceptable Yes          There are no Patient Instructions on file for this visit.   Follow up if symptoms worsen or fail to improve.          [1]   Current Outpatient Medications:     FLUoxetine 20 MG capsule, Take 20 mg by mouth once daily., Disp: , Rfl:     albuterol (PROVENTIL/VENTOLIN HFA) 90 mcg/actuation inhaler, Inhale 1-2 puffs into the lungs every 6 (six) hours as needed for Wheezing. Rescue, Disp: 18 g, Rfl: 2    pantoprazole (PROTONIX) 40 MG tablet, Take 1 tablet (40 mg total) by mouth once daily. for 7 days, Disp: 7 tablet, Rfl: 0

## 2025-03-19 NOTE — LETTER
March 19, 2025      [facility]  [address] [place] [address]  Phone: [phone]  Fax: [phone]       Patient: Tulio Chance   YOB: 2000  Date of Visit: 03/19/2025    To Whom It May Concern:    Pablito Chance  was at [facility] on 03/19/2025. The patient may return to work/school on 03/24/2025 with no restrictions. If you have any questions or concerns, or if I can be of further assistance, please do not hesitate to contact me.    Sincerely,    Elizabeth Hathaway LPN

## 2025-03-26 ENCOUNTER — TELEPHONE (OUTPATIENT)
Dept: FAMILY MEDICINE | Facility: CLINIC | Age: 25
End: 2025-03-26
Payer: COMMERCIAL

## 2025-03-26 NOTE — TELEPHONE ENCOUNTER
Patient was seen on 3/19/2025 for Covid. No TB skin test was placed due to it being a Wednesday afternoon since we are not here on Friday afternoons or Saturdays to read the results. Also patient was diagnosed with Covid at the visit.     Attempted to call patient. NO answer.

## 2025-03-26 NOTE — TELEPHONE ENCOUNTER
----- Message from Nurse Kendal sent at 3/25/2025  2:50 PM CDT -----  Regarding: FW: TB Skin Test Results  Can you check on this , Thanks a lot.  ----- Message -----  From: Vicki Parsons FNP  Sent: 3/25/2025   2:31 PM CDT  To: Kendal Montelongo LPN  Subject: RE: TB Skin Test Results                         I don't have any paperwork. I would recommend checking scan pile, media, or with Summar.  ----- Message -----  From: Kendal Montelongo LPN  Sent: 3/25/2025   2:09 PM CDT  To: YANN Castro  Subject: FW: TB Skin Test Results                         Do you have any paperwork on this patient , let me know what to advise patient , Thanks.  ----- Message -----  From: Heather Elaine  Sent: 3/25/2025   1:38 PM CDT  To: Issa Cohen Staff  Subject: TB Skin Test Results                             Who Called: Tulio Mooney the patient know what this is regarding?:TB Skin Test PaperworkPreferred Method of Contact: Phone CallPatient's Preferred Phone Number on File: [phone] Additional Information: Tulio called in requesting the paperwork from 03/19 regarding her TB skin test done by Vicki Parsons. States that she could come by this afternoon and  the paperwork. Please give her a courtesy call if need be. Thanks.

## 2025-04-29 ENCOUNTER — OFFICE VISIT (OUTPATIENT)
Dept: FAMILY MEDICINE | Facility: CLINIC | Age: 25
End: 2025-04-29
Payer: COMMERCIAL

## 2025-04-29 VITALS
SYSTOLIC BLOOD PRESSURE: 124 MMHG | HEART RATE: 77 BPM | TEMPERATURE: 98 F | HEIGHT: 66 IN | OXYGEN SATURATION: 98 % | WEIGHT: 273 LBS | RESPIRATION RATE: 20 BRPM | DIASTOLIC BLOOD PRESSURE: 82 MMHG | BODY MASS INDEX: 43.87 KG/M2

## 2025-04-29 DIAGNOSIS — J45.909 ASTHMA, UNSPECIFIED ASTHMA SEVERITY, UNSPECIFIED WHETHER COMPLICATED, UNSPECIFIED WHETHER PERSISTENT: ICD-10-CM

## 2025-04-29 DIAGNOSIS — G43.709 CHRONIC MIGRAINE WITHOUT AURA WITHOUT STATUS MIGRAINOSUS, NOT INTRACTABLE: Primary | ICD-10-CM

## 2025-04-29 PROCEDURE — 3008F BODY MASS INDEX DOCD: CPT | Mod: CPTII,,, | Performed by: FAMILY MEDICINE

## 2025-04-29 PROCEDURE — 3074F SYST BP LT 130 MM HG: CPT | Mod: CPTII,,, | Performed by: FAMILY MEDICINE

## 2025-04-29 PROCEDURE — 99214 OFFICE O/P EST MOD 30 MIN: CPT | Mod: 25,,, | Performed by: FAMILY MEDICINE

## 2025-04-29 PROCEDURE — 1159F MED LIST DOCD IN RCRD: CPT | Mod: CPTII,,, | Performed by: FAMILY MEDICINE

## 2025-04-29 PROCEDURE — 3079F DIAST BP 80-89 MM HG: CPT | Mod: CPTII,,, | Performed by: FAMILY MEDICINE

## 2025-04-29 PROCEDURE — 96372 THER/PROPH/DIAG INJ SC/IM: CPT | Mod: ,,, | Performed by: FAMILY MEDICINE

## 2025-04-29 RX ORDER — OXCARBAZEPINE 150 MG/1
150 TABLET, FILM COATED ORAL 2 TIMES DAILY
COMMUNITY
Start: 2025-02-10

## 2025-04-29 RX ORDER — KETOROLAC TROMETHAMINE 30 MG/ML
60 INJECTION, SOLUTION INTRAMUSCULAR; INTRAVENOUS
Status: COMPLETED | OUTPATIENT
Start: 2025-04-29 | End: 2025-04-29

## 2025-04-29 RX ORDER — FLUTICASONE PROPIONATE AND SALMETEROL 250; 50 UG/1; UG/1
1 POWDER RESPIRATORY (INHALATION) 2 TIMES DAILY
Qty: 60 EACH | Refills: 11 | Status: SHIPPED | OUTPATIENT
Start: 2025-04-29 | End: 2026-04-29

## 2025-04-29 RX ORDER — ATOGEPANT 60 MG/1
60 TABLET ORAL DAILY
Qty: 30 TABLET | Refills: 5 | Status: SHIPPED | OUTPATIENT
Start: 2025-04-29

## 2025-04-29 RX ORDER — METOCLOPRAMIDE 10 MG/1
10 TABLET ORAL 4 TIMES DAILY
Qty: 30 TABLET | Refills: 5 | Status: SHIPPED | OUTPATIENT
Start: 2025-04-29

## 2025-04-29 RX ADMIN — KETOROLAC TROMETHAMINE 60 MG: 30 INJECTION, SOLUTION INTRAMUSCULAR; INTRAVENOUS at 08:04

## 2025-04-29 NOTE — PROGRESS NOTES
Eric Lee MD        PATIENT NAME: Tulio Chance  : 2000  DATE: 25  MRN: [id number]      Billing Provider: Eric Lee MD  Level of Service: HI OFFICE/OUTPT VISIT, EST, LEVL IV, 30-39 MIN  Patient PCP Information       Provider PCP Type    Eric Lee MD General            Reason for Visit / Chief Complaint: Headache (Has been getting bad headaches and hot flashes ) and Insomnia (Not able to sleep)       Update PCP  Update Chief Complaint         History of Present Illness / Problem Focused Workflow     uTlio Chance presents to the clinic with Headache (Has been getting bad headaches and hot flashes ) and Insomnia (Not able to sleep)     On average two HA weekly with photophobia, nausea.  Frontal HA    Headache   Pertinent negatives include no abdominal pain, coughing, dizziness, eye pain, fever, nausea, neck pain, photophobia, rhinorrhea, seizures, sinus pressure, sore throat or weakness (global weakness).     Review of Systems     Review of Systems   Constitutional:  Negative for activity change, appetite change, fever and unexpected weight change.   HENT:  Negative for congestion, rhinorrhea, sinus pressure, sinus pain, sore throat and trouble swallowing.    Eyes:  Negative for photophobia, pain, discharge and visual disturbance.   Respiratory:  Negative for cough, chest tightness, wheezing and stridor.    Cardiovascular:  Negative for chest pain, palpitations and leg swelling.   Gastrointestinal:  Negative for abdominal pain, blood in stool, constipation, diarrhea and nausea.   Endocrine: Negative for polydipsia, polyphagia and polyuria.   Genitourinary:  Negative for difficulty urinating, flank pain and hematuria.   Musculoskeletal:  Negative for arthralgias and neck pain.   Skin:  Negative for rash.   Allergic/Immunologic: Negative for food allergies.   Neurological:  Positive for headaches. Negative for dizziness, tremors, seizures, syncope and weakness (global weakness).    Psychiatric/Behavioral:  Negative for behavioral problems, confusion, decreased concentration, dysphoric mood and hallucinations. The patient is not nervous/anxious.         Medical / Social / Family History     Past Medical History:   Diagnosis Date    Appendicitis     Asthma 12/22/2022    Breast lump     Breast mass        Past Surgical History:   Procedure Laterality Date    APPENDECTOMY      BREAST BIOPSY Right 5/3/2021    Procedure: BIOPSY, BREAST, WITH LUMPECTOMY;  Surgeon: Eva Farmer MD;  Location: [facility];  Service: General;  Laterality: Right;    TONSILLECTOMY Bilateral 11/19/2021    Procedure: TONSILLECTOMY;  Surgeon: Heber Tatum MD;  Location: [facility];  Service: ENT;  Laterality: Bilateral;       Social History  Ms.  reports that she has been smoking vaping with nicotine. She has never used smokeless tobacco. She reports current alcohol use. She reports that she does not use drugs.    Family History  Ms.'s family history includes Asthma in her brother; Diabetes in her maternal grandmother; Heart disease in her father and maternal grandfather; Hypertension in her father, maternal grandfather, maternal grandmother, and mother.    Medications and Allergies     Medications  Outpatient Medications Marked as Taking for the 4/29/25 encounter (Office Visit) with Eric Lee MD   Medication Sig Dispense Refill    FLUoxetine 20 MG capsule Take 20 mg by mouth once daily.      OXcarbazepine (TRILEPTAL) 150 MG Tab Take 150 mg by mouth 2 (two) times daily.         Allergies  Review of patient's allergies indicates:  No Known Allergies    Physical Examination     Vitals:    04/29/25 0751   BP: 124/82   Pulse: 77   Resp: 20   Temp: 97.5 °F (36.4 °C)     Physical Exam  Constitutional:       General: She is not in acute distress.     Appearance: Normal appearance.   HENT:      Head: Normocephalic.      Right Ear: Tympanic membrane and ear canal normal.      Left Ear: Tympanic membrane and ear  canal normal.      Nose: Nose normal.      Mouth/Throat:      Mouth: Mucous membranes are moist.      Pharynx: No oropharyngeal exudate.   Eyes:      Extraocular Movements: Extraocular movements intact.      Pupils: Pupils are equal, round, and reactive to light.   Cardiovascular:      Rate and Rhythm: Normal rate and regular rhythm.      Heart sounds: No murmur heard.  Pulmonary:      Effort: Pulmonary effort is normal.      Breath sounds: Normal breath sounds. No wheezing.   Abdominal:      General: Abdomen is flat. Bowel sounds are normal.      Palpations: Abdomen is soft.      Hernia: No hernia is present.   Musculoskeletal:         General: Normal range of motion.      Cervical back: Normal range of motion and neck supple.      Right lower leg: No edema.      Left lower leg: No edema.   Lymphadenopathy:      Cervical: No cervical adenopathy.   Skin:     General: Skin is warm and dry.      Coloration: Skin is not jaundiced.      Findings: No lesion.   Neurological:      General: No focal deficit present.      Mental Status: She is alert and oriented to person, place, and time.      Cranial Nerves: No cranial nerve deficit.      Gait: Gait normal.   Psychiatric:         Mood and Affect: Mood normal.         Behavior: Behavior normal.         Judgment: Judgment normal.          Assessment and Plan (including Health Maintenance)      Problem List  Smart Sets  Document Outside HM   :    Plan:     1. Asthma, unspecified asthma severity, unspecified whether complicated, unspecified whether persistent  The current medical regimen is effective;  continue present plan and medications.  -     fluticasone-salmeterol diskus inhaler 250-50 mcg; Inhale 1 puff into the lungs 2 (two) times daily. Controller  Dispense: 60 each; Refill: 11    2. Chronic migraine without aura without status migrainosus, not intractable    -     atogepant (QULIPTA) 60 mg Tab; Take 1 tablet (60 mg total) by mouth once daily.  Dispense: 30 tablet;  Refill: 5          Health Maintenance Due   Topic Date Due    Pneumococcal Vaccines (Age 0-49) (1 of 2 - PCV) Never done    Pap Smear  Never done    TETANUS VACCINE  07/16/2024    COVID-19 Vaccine (3 - 2024-25 season) 09/01/2024    Chlamydia Screening  09/06/2024       1. Chronic migraine without aura without status migrainosus, not intractable  -     atogepant (QULIPTA) 60 mg Tab; Take 1 tablet (60 mg total) by mouth once daily.  Dispense: 30 tablet; Refill: 5  -     metoclopramide HCl (REGLAN) 10 MG tablet; Take 1 tablet (10 mg total) by mouth 4 (four) times daily.  Dispense: 30 tablet; Refill: 5  -     ketorolac injection 60 mg    2. Asthma, unspecified asthma severity, unspecified whether complicated, unspecified whether persistent  -     fluticasone-salmeterol diskus inhaler 250-50 mcg; Inhale 1 puff into the lungs 2 (two) times daily. Controller  Dispense: 60 each; Refill: 11         Health Maintenance Topics with due status: Not Due       Topic Last Completion Date    RSV Vaccine (Age 60+ and Pregnant patients) Not Due       Future Appointments   Date Time Provider Department Center   5/28/2025  8:00 AM [facility]   1/23/2026  8:10 AM Eric Lee MD [facility]        There are no Patient Instructions on file for this visit.  Follow up in about 4 weeks (around 5/27/2025) for routine followup.     Signature:  Eric Lee MD      Date of encounter: 4/29/25

## 2025-06-12 ENCOUNTER — PATIENT OUTREACH (OUTPATIENT)
Facility: HOSPITAL | Age: 25
End: 2025-06-12
Payer: COMMERCIAL

## 2025-06-12 NOTE — PROGRESS NOTES
Population Health Chart Review & Patient Outreach Details    Updates Requested / Reviewed:  [x]  Care Everywhere Updated & Reviewed  [x]  [facility] Reviewed  [x]   Reviewed    Health Maintenance Topics Addressed and Outreach Outcomes / Actions Taken:  Chlamydia Screening  [x] No documentation found in [facility], [facility], or Care Everywhere for Chlamydia Screening.      [x] Comment placed in chart and upcoming appointment that patient needs this.

## (undated) DEVICE — CLOSURE SKIN 1 X 5  R1548

## (undated) DEVICE — SUTURE MONOCRYL 4-0 27IN

## (undated) DEVICE — SOL IRRIGATION SALINE 0.9% 1000ML BOTTLE

## (undated) DEVICE — CLEANER TIP BOVIE ELECTROSURG

## (undated) DEVICE — PENCIL HANDSWITCHING ROCKER SW

## (undated) DEVICE — SUTURE SILK 2-0 18 BLACK TIES

## (undated) DEVICE — TUBING SUCTION 5MM STERILE 3/16IN X 12FT

## (undated) DEVICE — DRESSING TELFA NONADH 3X4 STL AMD

## (undated) DEVICE — CDS BASIC

## (undated) DEVICE — SYRINGE ASEPTO IRRIGATION BULB 60CC LF DISP

## (undated) DEVICE — GLOVE SURGICAL PROTEXIS PI SIZE 6

## (undated) DEVICE — GLOVE SURGICAL PROTEXIS PI SIZE 6.5

## (undated) DEVICE — SUTURE VICRYL 3-0 SH UNDYED 27IN

## (undated) DEVICE — ADHESIVE LIQUID MASTISOL 2/3CC

## (undated) DEVICE — SUTURE CHROMIC 3-0 36 SH SH

## (undated) DEVICE — DRESSING IV TEGADERM 10X12CM

## (undated) DEVICE — NEEDLE INSULATED COATED EXTENDED

## (undated) DEVICE — PACK TIBURON BASIC

## (undated) DEVICE — GOWN SURGICAL SMARTGOWN LEVEL 4 / EXTRA LARGE STERILE

## (undated) DEVICE — GLOVE SURGICAL PROTEXIS PI BLUE SIZE 6.0

## (undated) DEVICE — TOWEL OR STERILE BLUE 4/PK 20PK/CS

## (undated) DEVICE — CLIP APPLIER LIGATION SM

## (undated) DEVICE — GLOVE SURGICAL PROTEXIS PI SIZE 7

## (undated) DEVICE — GLOVE SURGICAL PROTEXIS PI SIZE 7.5

## (undated) DEVICE — CLIP APPLIER LIGATION MED